# Patient Record
Sex: FEMALE | Race: WHITE | Employment: OTHER | URBAN - METROPOLITAN AREA
[De-identification: names, ages, dates, MRNs, and addresses within clinical notes are randomized per-mention and may not be internally consistent; named-entity substitution may affect disease eponyms.]

---

## 2018-01-30 ENCOUNTER — APPOINTMENT (OUTPATIENT)
Dept: CT IMAGING | Age: 68
DRG: 069 | End: 2018-01-30
Payer: COMMERCIAL

## 2018-01-30 ENCOUNTER — HOSPITAL ENCOUNTER (INPATIENT)
Age: 68
LOS: 2 days | Discharge: HOME | DRG: 069 | End: 2018-02-01
Attending: EMERGENCY MEDICINE | Admitting: NEUROLOGICAL SURGERY
Payer: COMMERCIAL

## 2018-01-30 ENCOUNTER — APPOINTMENT (OUTPATIENT)
Dept: MRI IMAGING | Age: 68
DRG: 069 | End: 2018-01-30
Payer: COMMERCIAL

## 2018-01-30 DIAGNOSIS — D32.9 MENINGIOMA (HCC): ICD-10-CM

## 2018-01-30 PROBLEM — G93.89 BRAIN MASS: Status: ACTIVE | Noted: 2018-01-30

## 2018-01-30 PROBLEM — R29.898 RIGHT ARM WEAKNESS: Status: ACTIVE | Noted: 2018-01-30

## 2018-01-30 PROBLEM — Z92.82 RECEIVED INTRAVENOUS TISSUE PLASMINOGEN ACTIVATOR (TPA) IN EMERGENCY DEPARTMENT: Status: ACTIVE | Noted: 2018-01-30

## 2018-01-30 PROBLEM — K14.8 TONGUE MASS: Status: ACTIVE | Noted: 2018-01-30

## 2018-01-30 LAB
ABSOLUTE EOS #: <0.03 K/UL (ref 0–0.44)
ABSOLUTE IMMATURE GRANULOCYTE: 0.03 K/UL (ref 0–0.3)
ABSOLUTE LYMPH #: 1.42 K/UL (ref 1.1–3.7)
ABSOLUTE MONO #: 0.56 K/UL (ref 0.1–1.2)
ANION GAP SERPL CALCULATED.3IONS-SCNC: 14 MMOL/L (ref 9–17)
BASOPHILS # BLD: 0 % (ref 0–2)
BASOPHILS ABSOLUTE: 0.04 K/UL (ref 0–0.2)
BUN BLDV-MCNC: 17 MG/DL (ref 8–23)
BUN/CREAT BLD: ABNORMAL (ref 9–20)
CALCIUM IONIZED: 1.27 MMOL/L (ref 1.13–1.33)
CALCIUM SERPL-MCNC: 9 MG/DL (ref 8.6–10.4)
CHLORIDE BLD-SCNC: 101 MMOL/L (ref 98–107)
CO2: 23 MMOL/L (ref 20–31)
CREAT SERPL-MCNC: 0.88 MG/DL (ref 0.5–0.9)
DIFFERENTIAL TYPE: ABNORMAL
EOSINOPHILS RELATIVE PERCENT: 0 % (ref 1–4)
ESTIMATED AVERAGE GLUCOSE: 131 MG/DL
GFR AFRICAN AMERICAN: >60 ML/MIN
GFR NON-AFRICAN AMERICAN: >60 ML/MIN
GFR SERPL CREATININE-BSD FRML MDRD: ABNORMAL ML/MIN/{1.73_M2}
GFR SERPL CREATININE-BSD FRML MDRD: ABNORMAL ML/MIN/{1.73_M2}
GLUCOSE BLD-MCNC: 116 MG/DL (ref 70–99)
HBA1C MFR BLD: 6.2 % (ref 4–6)
HCT VFR BLD CALC: 38.2 % (ref 36.3–47.1)
HEMOGLOBIN: 12.1 G/DL (ref 11.9–15.1)
IMMATURE GRANULOCYTES: 0 %
LYMPHOCYTES # BLD: 13 % (ref 24–43)
MAGNESIUM: 2.1 MG/DL (ref 1.6–2.6)
MCH RBC QN AUTO: 29.4 PG (ref 25.2–33.5)
MCHC RBC AUTO-ENTMCNC: 31.7 G/DL (ref 28.4–34.8)
MCV RBC AUTO: 92.9 FL (ref 82.6–102.9)
MONOCYTES # BLD: 5 % (ref 3–12)
NRBC AUTOMATED: 0 PER 100 WBC
PDW BLD-RTO: 13.2 % (ref 11.8–14.4)
PHOSPHORUS: 3.6 MG/DL (ref 2.6–4.5)
PLATELET # BLD: 263 K/UL (ref 138–453)
PLATELET ESTIMATE: ABNORMAL
PMV BLD AUTO: 10.9 FL (ref 8.1–13.5)
POTASSIUM SERPL-SCNC: 4.7 MMOL/L (ref 3.7–5.3)
RBC # BLD: 4.11 M/UL (ref 3.95–5.11)
RBC # BLD: ABNORMAL 10*6/UL
SEG NEUTROPHILS: 82 % (ref 36–65)
SEGMENTED NEUTROPHILS ABSOLUTE COUNT: 9.14 K/UL (ref 1.5–8.1)
SODIUM BLD-SCNC: 138 MMOL/L (ref 135–144)
WBC # BLD: 11.2 K/UL (ref 3.5–11.3)
WBC # BLD: ABNORMAL 10*3/UL

## 2018-01-30 PROCEDURE — 93005 ELECTROCARDIOGRAM TRACING: CPT

## 2018-01-30 PROCEDURE — 82330 ASSAY OF CALCIUM: CPT

## 2018-01-30 PROCEDURE — 99285 EMERGENCY DEPT VISIT HI MDM: CPT

## 2018-01-30 PROCEDURE — 70496 CT ANGIOGRAPHY HEAD: CPT

## 2018-01-30 PROCEDURE — 85025 COMPLETE CBC W/AUTO DIFF WBC: CPT

## 2018-01-30 PROCEDURE — 2000000003 HC NEURO ICU R&B

## 2018-01-30 PROCEDURE — 87641 MR-STAPH DNA AMP PROBE: CPT

## 2018-01-30 PROCEDURE — 6360000004 HC RX CONTRAST MEDICATION: Performed by: EMERGENCY MEDICINE

## 2018-01-30 PROCEDURE — 84100 ASSAY OF PHOSPHORUS: CPT

## 2018-01-30 PROCEDURE — 83036 HEMOGLOBIN GLYCOSYLATED A1C: CPT

## 2018-01-30 PROCEDURE — 70498 CT ANGIOGRAPHY NECK: CPT

## 2018-01-30 PROCEDURE — 6370000000 HC RX 637 (ALT 250 FOR IP): Performed by: NEUROLOGICAL SURGERY

## 2018-01-30 PROCEDURE — 83735 ASSAY OF MAGNESIUM: CPT

## 2018-01-30 PROCEDURE — A9579 GAD-BASE MR CONTRAST NOS,1ML: HCPCS | Performed by: EMERGENCY MEDICINE

## 2018-01-30 PROCEDURE — 6360000002 HC RX W HCPCS: Performed by: EMERGENCY MEDICINE

## 2018-01-30 PROCEDURE — 94762 N-INVAS EAR/PLS OXIMTRY CONT: CPT

## 2018-01-30 PROCEDURE — 36415 COLL VENOUS BLD VENIPUNCTURE: CPT

## 2018-01-30 PROCEDURE — 70450 CT HEAD/BRAIN W/O DYE: CPT

## 2018-01-30 PROCEDURE — 6370000000 HC RX 637 (ALT 250 FOR IP): Performed by: EMERGENCY MEDICINE

## 2018-01-30 PROCEDURE — 2580000003 HC RX 258: Performed by: NEUROLOGICAL SURGERY

## 2018-01-30 PROCEDURE — 99291 CRITICAL CARE FIRST HOUR: CPT | Performed by: NEUROLOGICAL SURGERY

## 2018-01-30 PROCEDURE — 80048 BASIC METABOLIC PNL TOTAL CA: CPT

## 2018-01-30 PROCEDURE — 70553 MRI BRAIN STEM W/O & W/DYE: CPT

## 2018-01-30 RX ORDER — HYDRALAZINE HYDROCHLORIDE 20 MG/ML
5 INJECTION INTRAMUSCULAR; INTRAVENOUS EVERY 6 HOURS PRN
Status: DISCONTINUED | OUTPATIENT
Start: 2018-01-30 | End: 2018-02-01 | Stop reason: HOSPADM

## 2018-01-30 RX ORDER — SODIUM CHLORIDE 0.9 % (FLUSH) 0.9 %
10 SYRINGE (ML) INJECTION EVERY 12 HOURS SCHEDULED
Status: DISCONTINUED | OUTPATIENT
Start: 2018-01-30 | End: 2018-02-01 | Stop reason: HOSPADM

## 2018-01-30 RX ORDER — SIMVASTATIN 40 MG
40 TABLET ORAL NIGHTLY
Status: ON HOLD | COMMUNITY
End: 2018-02-01 | Stop reason: HOSPADM

## 2018-01-30 RX ORDER — ATORVASTATIN CALCIUM 80 MG/1
80 TABLET, FILM COATED ORAL NIGHTLY
Status: DISCONTINUED | OUTPATIENT
Start: 2018-01-30 | End: 2018-01-31

## 2018-01-30 RX ORDER — FAMOTIDINE 20 MG/1
20 TABLET, FILM COATED ORAL 2 TIMES DAILY
Status: DISCONTINUED | OUTPATIENT
Start: 2018-01-30 | End: 2018-01-31

## 2018-01-30 RX ORDER — ACETAMINOPHEN 325 MG/1
650 TABLET ORAL EVERY 4 HOURS PRN
Status: DISCONTINUED | OUTPATIENT
Start: 2018-01-30 | End: 2018-02-01 | Stop reason: HOSPADM

## 2018-01-30 RX ORDER — SERTRALINE HYDROCHLORIDE 20 MG/ML
150 SOLUTION ORAL DAILY
Status: DISCONTINUED | OUTPATIENT
Start: 2018-01-30 | End: 2018-01-30

## 2018-01-30 RX ORDER — ONDANSETRON 2 MG/ML
4 INJECTION INTRAMUSCULAR; INTRAVENOUS EVERY 6 HOURS PRN
Status: DISCONTINUED | OUTPATIENT
Start: 2018-01-30 | End: 2018-02-01 | Stop reason: HOSPADM

## 2018-01-30 RX ORDER — CLONAZEPAM 0.5 MG/1
0.5 TABLET ORAL 2 TIMES DAILY
COMMUNITY

## 2018-01-30 RX ORDER — SODIUM CHLORIDE 0.9 % (FLUSH) 0.9 %
10 SYRINGE (ML) INJECTION PRN
Status: DISCONTINUED | OUTPATIENT
Start: 2018-01-30 | End: 2018-02-01 | Stop reason: HOSPADM

## 2018-01-30 RX ORDER — METFORMIN HYDROCHLORIDE 500 MG/1
500 TABLET, EXTENDED RELEASE ORAL 2 TIMES DAILY
Status: DISCONTINUED | OUTPATIENT
Start: 2018-01-30 | End: 2018-01-30

## 2018-01-30 RX ORDER — LISINOPRIL 2.5 MG/1
2.5 TABLET ORAL DAILY
Status: DISCONTINUED | OUTPATIENT
Start: 2018-01-30 | End: 2018-02-01 | Stop reason: HOSPADM

## 2018-01-30 RX ORDER — SODIUM CHLORIDE 0.9 % (FLUSH) 0.9 %
10 SYRINGE (ML) INJECTION 2 TIMES DAILY
Status: DISCONTINUED | OUTPATIENT
Start: 2018-01-30 | End: 2018-02-01 | Stop reason: HOSPADM

## 2018-01-30 RX ORDER — METFORMIN HYDROCHLORIDE 500 MG/1
500 TABLET, EXTENDED RELEASE ORAL 2 TIMES DAILY
COMMUNITY

## 2018-01-30 RX ORDER — CLONAZEPAM 0.25 MG/1
0.25 TABLET, ORALLY DISINTEGRATING ORAL 2 TIMES DAILY
Status: DISCONTINUED | OUTPATIENT
Start: 2018-01-30 | End: 2018-02-01 | Stop reason: HOSPADM

## 2018-01-30 RX ADMIN — Medication 10 ML: at 20:38

## 2018-01-30 RX ADMIN — IOPAMIDOL 90 ML: 755 INJECTION, SOLUTION INTRAVENOUS at 13:53

## 2018-01-30 RX ADMIN — ACETAMINOPHEN 650 MG: 325 TABLET ORAL at 20:37

## 2018-01-30 RX ADMIN — FAMOTIDINE 20 MG: 20 TABLET, FILM COATED ORAL at 20:37

## 2018-01-30 RX ADMIN — GADOPENTETATE DIMEGLUMINE 16 ML: 469.01 INJECTION INTRAVENOUS at 18:27

## 2018-01-30 RX ADMIN — HYDRALAZINE HYDROCHLORIDE 5 MG: 20 INJECTION INTRAMUSCULAR; INTRAVENOUS at 17:42

## 2018-01-30 RX ADMIN — CLONAZEPAM 0.25 MG: 0.25 TABLET, ORALLY DISINTEGRATING ORAL at 22:22

## 2018-01-30 RX ADMIN — ATORVASTATIN CALCIUM 80 MG: 80 TABLET, FILM COATED ORAL at 22:22

## 2018-01-30 ASSESSMENT — ENCOUNTER SYMPTOMS
RHINORRHEA: 0
ABDOMINAL PAIN: 0
SHORTNESS OF BREATH: 0

## 2018-01-30 ASSESSMENT — PAIN SCALES - GENERAL
PAINLEVEL_OUTOF10: 0
PAINLEVEL_OUTOF10: 3

## 2018-01-30 ASSESSMENT — PAIN DESCRIPTION - ORIENTATION: ORIENTATION: LOWER

## 2018-01-30 ASSESSMENT — PAIN DESCRIPTION - PAIN TYPE: TYPE: ACUTE PAIN

## 2018-01-30 ASSESSMENT — PAIN DESCRIPTION - DESCRIPTORS: DESCRIPTORS: ACHING;DISCOMFORT

## 2018-01-30 ASSESSMENT — PAIN DESCRIPTION - LOCATION: LOCATION: BACK

## 2018-01-30 NOTE — H&P
[address]    Neurocritical Care Progress Note  Neuro ICU History & Physical      History Obtained From:  Pt, EMR, ED staff, EMS     CHIEF COMPLAINT: Right arm weakness, right facial droop, slurred speech     HISTORY OF PRESENT ILLNESS:    The patient is a 78 y.o. female w/ pmhx DM on metformin and HTN who is transferred from outlying facility after receiving tPA for presumed CVA. Pt had right arm weakness and right sided facial droop w/ slurred speech early this morning but symptoms resolved. Pt then went to her doctor's to have breast ultrasound and symptoms returned just after 8 am this morning. She then went to the ED and had a negative CT head wo contrast. tPA was started at 12:02. Now, pt states slurred speech has improved , still having right arm weakness. Denies numbness, vision changes, HA or neck pain or dizziness. Past Medical History:        Diagnosis Date    Depression     Diabetes mellitus ([facility] [address]     Hyperlipidemia     Hypertension        Past Surgical History:        Procedure Laterality Date    HYSTERECTOMY         Medications Prior to Admission:    Not in a hospital admission. Allergies:    Review of patient's allergies indicates no known allergies. Social History:   TOBACCO:    ETOH:   DRUGS:     Family History:   No family history on file.     REVIEW OF SYSTEMS:  CONSTITUTIONAL:  negative for  fevers, chills, sweats and weight loss  HEENT:  negative for  nasal congestion, sore throat, hoarseness and voice change  RESPIRATORY:  negative for  cough with sputum, dyspnea, wheezing, hemoptysis and pleuritic pain  CARDIOVASCULAR:  negative for  chest pain, dyspnea, palpitations, orthopnea, PND, exertional chest pressure/discomfort  GASTROINTESTINAL:  negative for nausea, vomiting, diarrhea, constipation and abdominal pain  GENITOURINARY:  negative for frequency, dysuria and nocturia  MUSCULOSKELETAL:  negative for  myalgias, arthralgias and joint swelling  NEUROLOGICAL:  negative for memory problems, + for speech problems, neg for visual disturbance, + for weakness, neg for numbness and tingling    Vitals:  BP (!) 150/88   Pulse 98   Temp 99.5 °F (37.5 °C) (Oral)   Resp 24   SpO2 96%     PHYSICAL EXAM:  CONSTITUTIONAL:  awake, alert, cooperative, no apparent distress, and appears stated age  EYES:  Lids and lashes normal, pupils equal, round and reactive to light, extra ocular muscles intact, sclera clear, conjunctiva normal  NECK:  Supple, symmetrical, trachea midline, no adenopathy, thyroid symmetric, not enlarged and no tenderness, skin normal  HEMATOLOGIC/LYMPHATICS:  no cervical lymphadenopathy  LUNGS:  No increased work of breathing, good air exchange, clear to auscultation bilaterally, no crackles or wheezing  CARDIOVASCULAR:  Normal apical impulse, regular rate and rhythm, normal S1 and S2, no S3 or S4, and no murmur noted  ABDOMEN:  No scars, normal bowel sounds, soft, non-distended, non-tender, no masses palpated, no hepatosplenomegally  MUSCULOSKELETAL:  There is no redness, warmth, or swelling of the joints. Full range of motion noted. Motor strength is 5 out of 5 all extremities bilaterally. Tone is normal.  NEUROLOGIC:     CONSTITUTIONAL:  Well developed, well nourished, alert and oriented x 3, in no acute distress. GCS 15. Nontoxic. + dysarthria. No aphasia.    HEAD:  normocephalic, atraumatic    EYES:  PERRLA, EOMI.   ENT:  moist mucous membranes   NECK:  supple, symmetric, no midline tenderness to palpation    BACK:  without midline tenderness, step-offs or deformities    LUNGS:  Equal air entry bilaterally   CARDIOVASCULAR:  normal s1 / s2   ABDOMEN:  Soft, no rigidity   NEUROLOGIC:  Mental Status:  A & O x3,awake             Cranial Nerves:    cranial nerves II-XII are grossly intact other than right sided facial droop    Motor Exam:    Drift:  absent  Tone:  abnormal - RUE weakness    Motor exam is symmetrical 5 out of 5 all extremities bilaterally except for RUE 4/5    Sensory:    Touch:    Right Upper Extremity:  normal  Left Upper Extremity:  normal  Right Lower Extremity:  normal  Left Lower Extremity:  normal    Deep Tendon Reflexes:    Right Bicep:  2+  Left Bicep:  2+  Right Knee:  2+  Left Knee:  2+    Plantar Response:  Right:  downgoing  Left:  downgoing    Clonus:  absent  Alberto's:  absent    Coordination/Dysmetria:  Heel to Shin:  Right:  normal  Left:  normal  Finger to Nose:   Right:  abnormal -   Left:  normal   Dysdiadochokinesia:  absent    Gait: Deferred    SKIN:  no rash            DATA:  Pending      ASSESSMENT/PLAN:  78 y.o. female who presents s/p tPA at 12:02 after presenting to [facility] ED with right arm weakness, right facial droop and slurred speech. Pt notes mild improvement of slurred speech, NIH 4 currently.       Patient care will be discussed with attending, will reevaluated patient along with attending.      - CTA H/N              - MRI Brain WO              - ECHO, Carotid US B/L              - Hold ASA and plavix as pt is post tPA              - Zocor 80mg nightly               - Fasting Lipid panel              - PT, OT, Speech eval               - Hydrate with 500cc bolus then IVF NS @ 75cc/hr               - Telemetry               - Neuro checks per protocol  - We recommend SBP <185  - 24-hr post tPA CT head  - Cardio: Stable, RRR, f/u EKG, TTE, restart home meds (lisinopril)  - Pulm: Stable, spo2 96% on RA, no underlying pulm disease  - GI: Stable, NPO for now, will restart carb control diet, pepcid 20 mg bid  - Renal: F/u electrolytes, replace as needed, NS 75 cc/hr     Blood glucose goal less than 180  Please avoid dextrose containing solutions        Additional recommendations may follow     Please contact [facility] NS[facility] with any changes in patients neurologic status.      Thank you for your consult.            Tiera Porter,   Emergency Medicine Resident  Neuro Critical Care  Neurosurgical and Neuro

## 2018-01-30 NOTE — ED NOTES
Bed: 14  Expected date:   Expected time:   Means of arrival:   Comments:     Pietro Garcia, MAXX  01/30/18 6218

## 2018-01-30 NOTE — ED PROVIDER NOTES
[facility]  Emergency Department Encounter  Emergency Medicine Resident     Pt Name: Dorina Beaver  MRN: [id number]  [place] 1950  Date of evaluation: 1/30/18  PCP:  Deny Oleary MD    CHIEF COMPLAINT       Chief Complaint   Patient presents with    Facial Droop       HISTORY OF PRESENT ILLNESS  (Location/Symptom, Timing/Onset, Context/Setting, Quality, Duration, Modifying Factors, Severity, Associated signs/symptoms)     Dorina Beaver is a 78 y.o. female who presents As a transfer from [facility] after having right-sided weakness that started around 8:15 in the morning today. Patient had consultation done by neuro endovascular team while at [facility] and decision was made to start TPA. Patient's deficits include right arm weakness, right-sided facial droop with slurred speech. States that she did have some resolution in the morning and then subsequently had recurrence of her symptoms. Per Dr. Daren Gutiérrez, decision was made to give TPA. On arrival patient states that some of her symptoms have improved. PAST MEDICAL / SURGICAL / SOCIAL / FAMILY HISTORY      has a past medical history of Depression; Diabetes mellitus ([address] Hyperlipidemia; and Hypertension. has a past surgical history that includes Hysterectomy. Social History     Social History    Marital status:      Spouse name: N/A    Number of children: N/A    Years of education: N/A     Occupational History    Not on file. Social History Main Topics    Smoking status: Never Smoker    Smokeless tobacco: Never Used    Alcohol use 0.6 oz/week     1 Glasses of wine per week    Drug use: No    Sexual activity: No     Other Topics Concern    Not on file     Social History Narrative    No narrative on file       No family history on file. Allergies:  Review of patient's allergies indicates no known allergies.     Home Medications:  Prior to Admission medications    Medication Sig Start Date End Date tenderness. There is no guarding. Neurological: She is alert and oriented to person, place, and time. No sensory deficit. GCS eye subscore is 4. GCS verbal subscore is 5. GCS motor subscore is 6. Right-sided facial droop, right arm weakness compared to the left upper extremity weakness; positive pronator drift of the right upper extremity maintaining to gravity; no lower extremity weakness   Skin: Skin is warm and dry. She is not diaphoretic. Nursing note and vitals reviewed. DIFFERENTIAL  DIAGNOSIS     PLAN (LABS / IMAGING / EKG):  Orders Placed This Encounter   Procedures    MRSA DNA Probe, Nasal    CTA HEAD W CONTRAST    CTA NECK W CONTRAST    CT head without contrast    MRI brain without contrast    CT HEAD WO CONTRAST    Lipid panel - fasting    CBC WITH AUTO DIFFERENTIAL    BASIC METABOLIC PANEL    MAGNESIUM    Phosphorus    Calcium, Ionized    Diet NPO Effective Now    Vital signs during and post alteplase (tPA)    Telemetry monitoring    Notify physician during and post alteplase (tPA)    Notify physician during and post alteplase (tPA)    Bedrest during and post alteplase (tPA)    Elevate HOB during and post alteplase (tPA)    Adv Diet as Josephine (nurse communication)    Neuro checks during and post alteplase (tPA)    NIH Stroke Scale assessment by nurse    Implement alteplase (tPA) hemorrhage/bleeding precautions    Avoid antiplatelet and antithrombotic medications for 24 hours post administration of alteplase (tPA)    No Anticoagulants for 24 hours after alteplase (tPA)    Implement suspected intracerebral hemorrhage (ICH) guidelines    Assess for aphasia/dysphagia/severe dysarthria    Swallow screen by nursing before diet and oral medications started.     Stroke education    Encourage deep breathing and coughing every two hours while awake    Place intermittent pneumatic compression device    Full Code    OT eval and treat    PT evaluation and treat    Initiate Oxygen Therapy Protocol    Incentive spirometry    Pulse oximetry, continuous    Speech Language Pathology (SLP) eval and treat    EKG 12 Lead    ECHO Complete 2D W Doppler W Color    Initiate minimum 2 IV lines for alteplase (tPA) infusion    PATIENT STATUS (FROM ED OR OR/PROCEDURAL) Inpatient    Fall precautions       MEDICATIONS ORDERED:  Orders Placed This Encounter   Medications    iopamidol (ISOVUE-370) 76 % injection 90 mL    clonazePAM (KLONOPIN) disintegrating tablet 0.25 mg    lisinopril (PRINIVIL;ZESTRIL) tablet 2.5 mg    DISCONTD: metFORMIN (GLUCOPHAGE-XR) extended release tablet 500 mg    sertraline (ZOLOFT) 20 MG/ML concentrated solution 150 mg    sodium chloride flush 0.9 % injection 10 mL    sodium chloride flush 0.9 % injection 10 mL    acetaminophen (TYLENOL) tablet 650 mg    magnesium hydroxide (MILK OF MAGNESIA) 400 MG/5ML suspension 30 mL    ondansetron (ZOFRAN) injection 4 mg    atorvastatin (LIPITOR) tablet 80 mg    famotidine (PEPCID) tablet 20 mg     DIAGNOSTIC RESULTS / EMERGENCY DEPARTMENT COURSE / MDM     LABS:  No results found for this visit on 01/30/18. RADIOLOGY:  Ct Head Wo Contrast    Addendum Date: 1/30/2018    ADDENDUM: The extra-axial mass described in #3 of the impression is along the LEFT inferior frontal lobe as described in the findings section. Result Date: 1/30/2018  EXAMINATION: CT OF THE HEAD WITHOUT CONTRAST  1/30/2018 1:42 pm TECHNIQUE: CT of the head was performed without the administration of intravenous contrast. Dose modulation, iterative reconstruction, and/or weight based adjustment of the mA/kV was utilized to reduce the radiation dose to as low as reasonably achievable. COMPARISON: 01/30/2018 at 10:27 a.m. HISTORY: Right arm weakness with slurred speech. Initial evaluation. FINDINGS: BRAIN/VENTRICLES: There is no evidence of an acute intracranial hemorrhage.  There appears to be an extra-axial hyperdense mass along the inferior left criteria. BONES: No acute osseous abnormality identified. CTA HEAD: ANTERIOR CIRCULATION: There is non flow limiting atherosclerosis involving the internal carotid arteries bilaterally. No focal stenosis seen of the anterior cerebral or middle cerebral arteries. POSTERIOR CIRCULATION: The posterior cerebral arteries demonstrate no focal stenosis. The vertebral and basilar arteries appear unremarkable. ANEURYSM: No intracranial aneurysm is seen. BRAIN: Enhancing extra-axial masses seen along is the inferior left frontal convexity. There is mass effect on the inferior left frontal lobe. 1. No focal stenosis is seen of the cervical carotid or vertebral arteries. 2. No focal stenosis of the [place]. 3. Incidentally noted is an enhancing lobulated mass within the right tongue base. Consider correlation with direct visualization. Findings were discussed with Dr. Pallavi Mcneil on 1/30/2018 at 2:21 pm.      EMERGENCY DEPARTMENT COURSE:    MDM: Patient was immediately evaluated by neuro endovascular team as well as neuro ICU arrival.  Continues to have right-sided facial droop as well as right upper and 70 weakness. She is alert and oriented this time protecting her own airway. Finished her TPA in route to the emergency department here. She will be admitted to the neuro ICU for further management. TPA post protocol. PROCEDURES:  None    CONSULTS:  None    FINAL IMPRESSION      1. Facial droop due to stroke          DISPOSITION / PLAN     DISPOSITION Admitted 01/30/2018 01:32:58 PM      PATIENT REFERRED TO:  No follow-up provider specified.     DISCHARGE MEDICATIONS:  Current Discharge Medication List          Carol Amin MD  Emergency Medicine Resident, PGY-2  [facility]    (Please note that portions of this note were completed with a voice recognition program.  Efforts were made to edit the dictations but occasionally words are mis-transcribed.)       Carol Amin

## 2018-01-30 NOTE — ED NOTES
Report called to [facility], Questions Answered.       Zach Zafar, RN  01/30/18 7390

## 2018-01-30 NOTE — ED NOTES
Slurred speech, right facial droop. Right facial droop. No headache, no visual changes. 7.65 mg bolus of TPA at 1202  68.8 mg drip of TPA at 1204  TPA completed at 1306 for a total of 76.5 mg   NS to flush line for 20 mL.       Eleanor Morejon RN  01/30/18 7589

## 2018-01-30 NOTE — ED NOTES
Patient arrived to ED via [facility], transfer from [facility]. Pt was getting ready at [place] [address] today drying her hair, pt has right sided weakness, some of the weakness decreased and patient had to go to [facility] for breast ultrasound. Pt was at hospital and at [facility] when she began having right sided weakness, right facial droop, right hand weakness, and slurred speech. TPA was administered PTA. On arrival pt denies any visual changes. States she has some left shoulder pain, does not want medication, states she believes it's from how she has been sitting. Pt states she has some right arm weakness but patient is able to move all extremities. Pt has equal, strong hand grasp. Pt has right sided facial droop, right tongue deviation, and slurred speech. Pt is aox4, answering questions appropriately.  Pt son Mari Phillips is en route to hospital.      Constanza Yip RN  01/30/18 6538

## 2018-01-30 NOTE — ED PROVIDER NOTES
[facility]     Emergency Department     Faculty Note/ Attestation      Pt Name: Reynaldo Paulino                                       MRN: [id number]  [place] 1950  Date of evaluation: 1/30/2018    Patients PCP:    No primary care provider on file. Attestation  I performed a history and physical examination of the patient and discussed management with the resident. I reviewed the residents note and agree with the documented findings and plan of care. Any areas of disagreement are noted on the chart. I was personally present for the key portions of any procedures. I have documented in the chart those procedures where I was not present during the key portions. I have reviewed the emergency nurses triage note. I agree with the chief complaint, past medical history, past surgical history, allergies, medications, social and family history as documented unless otherwise noted below. For Physician Assistant/ Nurse Practitioner cases/documentation I have personally evaluated this patient and have completed at least one if not all key elements of the E/M (history, physical exam, and MDM). Additional findings are as noted. Initial Screens:             Vitals: There were no vitals filed for this visit. CHIEF COMPLAINT       Chief Complaint   Patient presents with    Facial Droop             DIAGNOSTIC RESULTS             RADIOLOGY:   No orders to display         LABS:  Labs Reviewed - No data to display      EMERGENCY DEPARTMENT COURSE:     -------------------------   ,  ,  ,        Comments            [place] MD, F.A.C.E.P.   Attending Emergency Physician         Milka Fields MD  01/30/18 9394

## 2018-01-30 NOTE — FLOWSHEET NOTE
01/30/18 1427   Encounter Summary   Services provided to: Patient   Continue Visiting (1/30/18)   Complexity of Encounter Moderate   Length of Encounter 30 minutes   Spiritual Assessment Completed Yes   Routine   Type Initial       SPIRITUAL CARE DEPARTMENT - [facility]     Emergency/Trauma Note    PATIENT NAME: Nata Bernal    Shift date: 1/30/18  Shift day: Tuesday   Shift # 1    Room # 6479/6828-52   Name: Nata Bernal            Age: 78 y.o. Gender: female          Sikh: [address] [place] [address] [employer] of [employer]: [employer], [address] [place] [address]  Trauma/Incident type: Stroke Alert  Admit Date & Time: 1/30/2018  1:09 PM    PATIENT/EVENT DESCRIPTION:  Nata Bernal is a 78 y.o. female who arrived via ground ambulance as transfer from [facility]. Per report the patient arrived at [facility] with stroke symptoms including facial droop, slurred speech and right sided weakness. She was given tpa at [place] and then brought to [facility]. V's. Since arrival at [facility]. V's her symptoms have mostly resolved. Pt to be admitted to 0516/0516-01. SPIRITUAL ASSESSMENT/INTERVENTION:  I spoke with the patient who was awake and alert and talking clearly. She admitted to being a bit anxious because of the excitement of her sudden illness and stops in 2 different hospitals and now feeling so far from home. Her family was yet to arrive at [facility]. V's at the time of this writing. Patient indicates that she occasionally attends [employer] in [address] [place] [address], which she refers to as a eNovance. She was receptive to my visit and and accepted my offer to pray for her. PATIENT BELONGINGS:  I did not handle the patient's belongings. REGISTRATION STAFF NOTIFIED? Yes    WHAT IS YOUR SPIRITUAL CARE PLAN FOR THIS PATIENT?:  Chaplains are available for further spiritual and emotional support as needed.     Electronically signed by Chaplain Braden, on 1/30/2018 at 2:28 PM.

## 2018-01-31 ENCOUNTER — APPOINTMENT (OUTPATIENT)
Dept: CT IMAGING | Age: 68
DRG: 069 | End: 2018-01-31
Payer: COMMERCIAL

## 2018-01-31 PROBLEM — D32.9 MENINGIOMA (HCC): Status: ACTIVE | Noted: 2018-01-31

## 2018-01-31 PROBLEM — G45.1 HEMISPHERIC CAROTID ARTERY SYNDROME: Status: ACTIVE | Noted: 2018-01-31

## 2018-01-31 LAB
ABSOLUTE EOS #: 0.13 K/UL (ref 0–0.44)
ABSOLUTE IMMATURE GRANULOCYTE: 0.03 K/UL (ref 0–0.3)
ABSOLUTE LYMPH #: 2.51 K/UL (ref 1.1–3.7)
ABSOLUTE MONO #: 0.78 K/UL (ref 0.1–1.2)
ANION GAP SERPL CALCULATED.3IONS-SCNC: 11 MMOL/L (ref 9–17)
BASOPHILS # BLD: 1 % (ref 0–2)
BASOPHILS ABSOLUTE: 0.05 K/UL (ref 0–0.2)
BUN BLDV-MCNC: 15 MG/DL (ref 8–23)
BUN/CREAT BLD: ABNORMAL (ref 9–20)
CALCIUM IONIZED: 1.18 MMOL/L (ref 1.13–1.33)
CALCIUM SERPL-MCNC: 9.1 MG/DL (ref 8.6–10.4)
CHLORIDE BLD-SCNC: 101 MMOL/L (ref 98–107)
CHOLESTEROL/HDL RATIO: 1.8
CHOLESTEROL: 119 MG/DL
CO2: 25 MMOL/L (ref 20–31)
CREAT SERPL-MCNC: 1 MG/DL (ref 0.5–0.9)
DIFFERENTIAL TYPE: ABNORMAL
EKG ATRIAL RATE: 87 BPM
EKG P AXIS: 53 DEGREES
EKG P-R INTERVAL: 166 MS
EKG Q-T INTERVAL: 364 MS
EKG QRS DURATION: 64 MS
EKG QTC CALCULATION (BAZETT): 438 MS
EKG R AXIS: 14 DEGREES
EKG T AXIS: 50 DEGREES
EKG VENTRICULAR RATE: 87 BPM
EOSINOPHILS RELATIVE PERCENT: 2 % (ref 1–4)
GFR AFRICAN AMERICAN: >60 ML/MIN
GFR NON-AFRICAN AMERICAN: 55 ML/MIN
GFR SERPL CREATININE-BSD FRML MDRD: ABNORMAL ML/MIN/{1.73_M2}
GFR SERPL CREATININE-BSD FRML MDRD: ABNORMAL ML/MIN/{1.73_M2}
GLUCOSE BLD-MCNC: 109 MG/DL (ref 70–99)
HCT VFR BLD CALC: 35.9 % (ref 36.3–47.1)
HDLC SERPL-MCNC: 65 MG/DL
HEMOGLOBIN: 11.2 G/DL (ref 11.9–15.1)
IMMATURE GRANULOCYTES: 0 %
LDL CHOLESTEROL: 42 MG/DL (ref 0–130)
LV EF: 55 %
LVEF MODALITY: NORMAL
LYMPHOCYTES # BLD: 29 % (ref 24–43)
MAGNESIUM: 2.1 MG/DL (ref 1.6–2.6)
MCH RBC QN AUTO: 28.6 PG (ref 25.2–33.5)
MCHC RBC AUTO-ENTMCNC: 31.2 G/DL (ref 28.4–34.8)
MCV RBC AUTO: 91.6 FL (ref 82.6–102.9)
MONOCYTES # BLD: 9 % (ref 3–12)
MRSA, DNA, NASAL: NORMAL
NRBC AUTOMATED: 0 PER 100 WBC
PDW BLD-RTO: 13.6 % (ref 11.8–14.4)
PHOSPHORUS: 4 MG/DL (ref 2.6–4.5)
PLATELET # BLD: 236 K/UL (ref 138–453)
PLATELET ESTIMATE: ABNORMAL
PMV BLD AUTO: 10.8 FL (ref 8.1–13.5)
POTASSIUM SERPL-SCNC: 4.4 MMOL/L (ref 3.7–5.3)
RBC # BLD: 3.92 M/UL (ref 3.95–5.11)
RBC # BLD: ABNORMAL 10*6/UL
SEG NEUTROPHILS: 59 % (ref 36–65)
SEGMENTED NEUTROPHILS ABSOLUTE COUNT: 5.14 K/UL (ref 1.5–8.1)
SODIUM BLD-SCNC: 137 MMOL/L (ref 135–144)
SPECIMEN DESCRIPTION: NORMAL
TRIGL SERPL-MCNC: 61 MG/DL
VLDLC SERPL CALC-MCNC: NORMAL MG/DL (ref 1–30)
WBC # BLD: 8.6 K/UL (ref 3.5–11.3)
WBC # BLD: ABNORMAL 10*3/UL

## 2018-01-31 PROCEDURE — 83735 ASSAY OF MAGNESIUM: CPT

## 2018-01-31 PROCEDURE — G8988 SELF CARE GOAL STATUS: HCPCS

## 2018-01-31 PROCEDURE — 2580000003 HC RX 258: Performed by: FAMILY MEDICINE

## 2018-01-31 PROCEDURE — 36415 COLL VENOUS BLD VENIPUNCTURE: CPT

## 2018-01-31 PROCEDURE — G8989 SELF CARE D/C STATUS: HCPCS

## 2018-01-31 PROCEDURE — 80048 BASIC METABOLIC PNL TOTAL CA: CPT

## 2018-01-31 PROCEDURE — 97162 PT EVAL MOD COMPLEX 30 MIN: CPT

## 2018-01-31 PROCEDURE — G9175 SPEECH LANG GOAL STATUS: HCPCS

## 2018-01-31 PROCEDURE — 99223 1ST HOSP IP/OBS HIGH 75: CPT | Performed by: FAMILY MEDICINE

## 2018-01-31 PROCEDURE — 95951 PR EEG MONITORING/VIDEORECORD: CPT | Performed by: PSYCHIATRY & NEUROLOGY

## 2018-01-31 PROCEDURE — 6370000000 HC RX 637 (ALT 250 FOR IP): Performed by: NEUROLOGICAL SURGERY

## 2018-01-31 PROCEDURE — 85025 COMPLETE CBC W/AUTO DIFF WBC: CPT

## 2018-01-31 PROCEDURE — 95951 HC EEG MONITORING VIDEO RECORDING: CPT

## 2018-01-31 PROCEDURE — 84100 ASSAY OF PHOSPHORUS: CPT

## 2018-01-31 PROCEDURE — 94762 N-INVAS EAR/PLS OXIMTRY CONT: CPT

## 2018-01-31 PROCEDURE — 82330 ASSAY OF CALCIUM: CPT

## 2018-01-31 PROCEDURE — 70450 CT HEAD/BRAIN W/O DYE: CPT

## 2018-01-31 PROCEDURE — 6370000000 HC RX 637 (ALT 250 FOR IP): Performed by: EMERGENCY MEDICINE

## 2018-01-31 PROCEDURE — 92523 SPEECH SOUND LANG COMPREHEN: CPT

## 2018-01-31 PROCEDURE — G9174 SPEECH LANG CURRENT STATUS: HCPCS

## 2018-01-31 PROCEDURE — 97530 THERAPEUTIC ACTIVITIES: CPT

## 2018-01-31 PROCEDURE — 97165 OT EVAL LOW COMPLEX 30 MIN: CPT

## 2018-01-31 PROCEDURE — 97535 SELF CARE MNGMENT TRAINING: CPT

## 2018-01-31 PROCEDURE — G8978 MOBILITY CURRENT STATUS: HCPCS

## 2018-01-31 PROCEDURE — 99233 SBSQ HOSP IP/OBS HIGH 50: CPT | Performed by: NEUROLOGICAL SURGERY

## 2018-01-31 PROCEDURE — 2060000000 HC ICU INTERMEDIATE R&B

## 2018-01-31 PROCEDURE — 6370000000 HC RX 637 (ALT 250 FOR IP): Performed by: FAMILY MEDICINE

## 2018-01-31 PROCEDURE — 93306 TTE W/DOPPLER COMPLETE: CPT

## 2018-01-31 PROCEDURE — G8987 SELF CARE CURRENT STATUS: HCPCS

## 2018-01-31 PROCEDURE — G8979 MOBILITY GOAL STATUS: HCPCS

## 2018-01-31 PROCEDURE — 6360000002 HC RX W HCPCS: Performed by: PSYCHIATRY & NEUROLOGY

## 2018-01-31 PROCEDURE — 80061 LIPID PANEL: CPT

## 2018-01-31 RX ORDER — SODIUM CHLORIDE 9 MG/ML
INJECTION, SOLUTION INTRAVENOUS CONTINUOUS
Status: ACTIVE | OUTPATIENT
Start: 2018-01-31 | End: 2018-02-01

## 2018-01-31 RX ORDER — SENNA PLUS 8.6 MG/1
1 TABLET ORAL NIGHTLY
Status: DISCONTINUED | OUTPATIENT
Start: 2018-01-31 | End: 2018-02-01 | Stop reason: HOSPADM

## 2018-01-31 RX ORDER — DEXTROSE MONOHYDRATE 25 G/50ML
12.5 INJECTION, SOLUTION INTRAVENOUS PRN
Status: DISCONTINUED | OUTPATIENT
Start: 2018-01-31 | End: 2018-02-01 | Stop reason: HOSPADM

## 2018-01-31 RX ORDER — ASPIRIN 81 MG/1
81 TABLET, CHEWABLE ORAL DAILY
Status: DISCONTINUED | OUTPATIENT
Start: 2018-01-31 | End: 2018-02-01 | Stop reason: HOSPADM

## 2018-01-31 RX ORDER — LEVETIRACETAM 10 MG/ML
1000 INJECTION INTRAVASCULAR ONCE
Status: COMPLETED | OUTPATIENT
Start: 2018-01-31 | End: 2018-01-31

## 2018-01-31 RX ORDER — LEVETIRACETAM 5 MG/ML
500 INJECTION INTRAVASCULAR EVERY 12 HOURS
Status: DISCONTINUED | OUTPATIENT
Start: 2018-02-01 | End: 2018-02-01 | Stop reason: HOSPADM

## 2018-01-31 RX ORDER — NICOTINE POLACRILEX 4 MG
15 LOZENGE BUCCAL PRN
Status: DISCONTINUED | OUTPATIENT
Start: 2018-01-31 | End: 2018-02-01 | Stop reason: HOSPADM

## 2018-01-31 RX ORDER — LOSARTAN POTASSIUM 100 MG/1
100 TABLET ORAL DAILY
Status: ON HOLD | COMMUNITY
End: 2018-02-01 | Stop reason: HOSPADM

## 2018-01-31 RX ORDER — ATORVASTATIN CALCIUM 40 MG/1
40 TABLET, FILM COATED ORAL NIGHTLY
Status: DISCONTINUED | OUTPATIENT
Start: 2018-01-31 | End: 2018-02-01 | Stop reason: HOSPADM

## 2018-01-31 RX ORDER — DEXTROSE MONOHYDRATE 50 MG/ML
100 INJECTION, SOLUTION INTRAVENOUS PRN
Status: DISCONTINUED | OUTPATIENT
Start: 2018-01-31 | End: 2018-02-01 | Stop reason: HOSPADM

## 2018-01-31 RX ADMIN — FAMOTIDINE 20 MG: 20 TABLET, FILM COATED ORAL at 09:07

## 2018-01-31 RX ADMIN — SERTRALINE 150 MG: 50 TABLET, FILM COATED ORAL at 09:06

## 2018-01-31 RX ADMIN — DESMOPRESSIN ACETATE 40 MG: 0.2 TABLET ORAL at 21:11

## 2018-01-31 RX ADMIN — LISINOPRIL 2.5 MG: 2.5 TABLET ORAL at 09:07

## 2018-01-31 RX ADMIN — ASPIRIN 81 MG: 81 TABLET, CHEWABLE ORAL at 21:11

## 2018-01-31 RX ADMIN — LEVETIRACETAM 1000 MG: 1000 INJECTION, SOLUTION INTRAVENOUS at 21:11

## 2018-01-31 RX ADMIN — CLONAZEPAM 0.25 MG: 0.25 TABLET, ORALLY DISINTEGRATING ORAL at 09:07

## 2018-01-31 RX ADMIN — CLONAZEPAM 0.25 MG: 0.25 TABLET, ORALLY DISINTEGRATING ORAL at 23:29

## 2018-01-31 RX ADMIN — SODIUM CHLORIDE: 9 INJECTION, SOLUTION INTRAVENOUS at 21:10

## 2018-01-31 ASSESSMENT — ENCOUNTER SYMPTOMS
CONSTIPATION: 0
ABDOMINAL PAIN: 0
NAUSEA: 0
BLOOD IN STOOL: 0
SINUS PRESSURE: 0
DIARRHEA: 0
VOMITING: 0
VOICE CHANGE: 0
COUGH: 0
SHORTNESS OF BREATH: 0
WHEEZING: 0
SORE THROAT: 0

## 2018-01-31 ASSESSMENT — PAIN DESCRIPTION - PAIN TYPE: TYPE: ACUTE PAIN

## 2018-01-31 ASSESSMENT — PAIN DESCRIPTION - ORIENTATION: ORIENTATION: ANTERIOR;POSTERIOR

## 2018-01-31 ASSESSMENT — PAIN DESCRIPTION - DESCRIPTORS: DESCRIPTORS: ACHING;HEADACHE

## 2018-01-31 ASSESSMENT — PAIN SCALES - GENERAL: PAINLEVEL_OUTOF10: 1

## 2018-01-31 ASSESSMENT — PAIN DESCRIPTION - FREQUENCY: FREQUENCY: CONTINUOUS

## 2018-01-31 ASSESSMENT — PAIN DESCRIPTION - LOCATION: LOCATION: HEAD

## 2018-01-31 NOTE — PROGRESS NOTES
Tolerance  Activity Tolerance: Patient Tolerated treatment well          Plan   Plan  Times per week: 5-6 times/week  Times per day: Daily  Current Treatment Recommendations: Strengthening, Balance Training, Functional Mobility Training, Transfer Training, Gait Training, Stair training, Endurance Training, Home Exercise Program  Safety Devices  Type of devices: All fall risk precautions in place, Call light within reach, Gait belt, Patient at risk for falls, Left in chair, Nurse notified  Restraints  Initially in place: No    G-Code  PT G-Codes  Functional Assessment Tool Used: [place]  Score: 24  Functional Limitation: Mobility: Walking and moving around  Mobility: Walking and Moving Around Current Status ():  At least 1 percent but less than 20 percent impaired, limited or restricted  Mobility: Walking and Moving Around Goal Status (): 0 percent impaired, limited or restricted             Goals  Short term goals  Time Frame for Short term goals: 12 visits  Short term goal 1: pt ambulate 500 ft on level surfaces independently  Short term goal 2: pt ascend/descend 3 stairs with 1 handrail independently  Short term goal 3: pt independent in functiontal transfers       Therapy Time   Individual Concurrent Group Co-treatment   Time In 0815         Time Out 0840         Minutes 25                 Nini Park, SPT  This patient was seen by and documented on by Boris Ferrera, Student Physical Therapist, under the supervision of Sisi Carney, Physical Therapist.

## 2018-01-31 NOTE — CARE COORDINATION
1600 Spoke to patient regarding transfer to [facility] facility. Patient has chosen [address]  Access contacted for transfer. Spoke to Brenda @ [facility] regarding discharge planned for tomorrow. She states that if patient is discharged tomorrow that they will not need to be transferred.    Informed patient and Physician

## 2018-01-31 NOTE — CONSULTS
Date End Date Taking? Authorizing Provider   losartan (COZAAR) 100 MG tablet Take 100 mg by mouth daily   Yes Historical Provider, MD   metFORMIN (GLUCOPHAGE-XR) 500 MG extended release tablet Take 500 mg by mouth 2 times daily   Yes Historical Provider, MD   LISINOPRIL PO Take 2.5 mg by mouth    Yes Historical Provider, MD   clonazePAM (KLONOPIN) 0.25 MG disintegrating tablet Take 0.25 mg by mouth 2 times daily. Yes Historical Provider, MD   Sertraline HCl (ZOLOFT PO) Take 150 mg by mouth daily    Yes Historical Provider, MD   simvastatin (ZOCOR) 40 MG tablet Take 40 mg by mouth nightly   Yes Historical Provider, MD         REVIEW OF SYSTEMS:       CONSTITUTIONAL: negative for  fevers, chills, fatigue and malaise   EYES: negative for double vision, blurred vision and photophobia    HEENT: negative for tinnitus, epistaxis and sore throat   RESPIRATORY: negative for cough, shortness of breath, wheezing   CARDIOVASCULAR: negative for chest pain, palpitations, syncope, edema   GASTROINTESTINAL: negative for nausea, vomiting, diarrhea, constipation, abdominal pain   GENITOURINARY: negative for incontinence   MUSCULOSKELETAL: negative for neck or back pain   NEUROLOGICAL: positive for speech problems and weakness  negative for headaches, dizziness, seizures and visual disturbance   PSYCHIATRIC: negative for agitated     Review of systems otherwise negative.     PHYSICAL EXAM:       /67   Pulse 80   Temp 96.8 °F (36 °C) (Oral)   Resp 18   Ht 5' 2\" (1.575 m)   Wt 182 lb (82.6 kg)   SpO2 97%   BMI 33.29 kg/m²       CONSTITUTIONAL: no apparent distress, appears stated age   HEAD: normocephalic, atraumatic   ENT: moist mucous membranes, uvula midline   NECK: supple, symmetric, no midline tenderness to palpation   BACK: without midline tenderness, step-offs or deformities   LUNGS: clear to auscultation bilaterally   CARDIOVASCULAR: regular rate and rhythm   ABDOMEN: Soft, non-tender, non-distended with normal active bowel sounds   NEUROLOGIC:  EYE OPENING     Spontaneous - 4 [x]       To voice - 3 []       To pain - 2 []       None - 1 []    VERBAL RESPONSE     Appropriate, oriented - 5 [x]       Dazed or confused - 4 []       Syllables, expletives - 3 []       Grunts - 2 []       None - 1 []    MOTOR RESPONSE     Spontaneous, command - 6 [x]       Localizes pain - 5 []       Withdraws pain - 4 []       Abnormal flexion - 3 []       Abnormal extension - 2 []       None - 1 []            Total GCS:  15    Mental Status:  A & O x3    awake               Cranial Nerves:    cranial nerves II-XII are grossly intact, visual fields intact  R facial droop+    Motor Exam:    Drift:  absent  Tone:  normal    Motor exam is symmetrical 5 out of 5 all extremities bilaterally    Sensory:    Touch:    Right Upper Extremity:  normal  Left Upper Extremity:  normal  Right Lower Extremity:  normal  Left Lower Extremity:  normal    Deep Tendon Reflexes:    Right Bicep:  2+  Left Bicep:  2+  Right Knee:  2+  Left Knee:  2+    Plantar Response:  Right:  downgoing  Left:  downgoing    Clonus:  N/A  Alberto's:  N/A    Coordination/Dysmetria:  Right:  normal  Left:  normal      Gait:  Not assessed     SKIN: no rash           LABS AND IMAGING:     CBC:   Lab Results   Component Value Date    WBC 8.6 01/31/2018    RBC 3.92 01/31/2018    HGB 11.2 01/31/2018    HCT 35.9 01/31/2018    MCV 91.6 01/31/2018    MCH 28.6 01/31/2018    MCHC 31.2 01/31/2018    RDW 13.6 01/31/2018     01/31/2018    MPV 10.8 01/31/2018     Platelets:    Lab Results   Component Value Date     01/31/2018     BMP:    Lab Results   Component Value Date     01/31/2018    K 4.4 01/31/2018     01/31/2018    CO2 25 01/31/2018    BUN 15 01/31/2018    CREATININE 1.00 01/31/2018    CALCIUM 9.1 01/31/2018    GFRAA >60 01/31/2018    LABGLOM 55 01/31/2018    GLUCOSE 109 01/31/2018     Warfarin PT/INR:  No components found for: PTPATWAR, PTINRWAR  U/A:  No results found for: NITRITE, COLORU, PHUR, LABCAST, [address], RBCUA, MUCUS, TRICHOMONAS, YEAST, BACTERIA, CLARITYU, SPECGRAV, LEUKOCYTESUR, UROBILINOGEN, BILIRUBINUR, BLOODU, GLUCOSEU, AMORPHOUS       Radiology Review:    Ct Head Without Contrast    Result Date: 1/31/2018  EXAMINATION: CT OF THE HEAD WITHOUT CONTRAST  1/31/2018 12:09 pm TECHNIQUE: CT of the head was performed without the administration of intravenous contrast. Dose modulation, iterative reconstruction, and/or weight based adjustment of the mA/kV was utilized to reduce the radiation dose to as low as reasonably achievable. COMPARISON: 30 January 2018 HISTORY: ORDERING SYSTEM PROVIDED HISTORY: 24-hr post tpa TECHNOLOGIST PROVIDED HISTORY: Has a \"code stroke\" or \"stroke alert\" been called? ->No FINDINGS: BRAIN/VENTRICLES: There is no acute intracranial hemorrhage, or midline shift. Extra-axial calcified mass in the left frontal fossa is noted without significant mass effect consistent with meningioma of 5.4 mm. No abnormal extra-axial fluid collection. The gray-white differentiation is maintained without evidence of an acute infarct. There is no evidence of hydrocephalus. Encephalomalacia is re-demonstrated in the right occipital lobe with subtle mass effect consistent with a subacute infarct. Patchy hypodensity is present in the white matter likely related to chronic microvascular change. ORBITS: The visualized portion of the orbits demonstrate no acute abnormality. SINUSES: The visualized paranasal sinuses and mastoid air cells demonstrate no acute abnormality. SOFT TISSUES/SKULL:  No acute abnormality of the visualized skull or soft tissues. Estimated biologic radiation dose for this procedure:  1127.57 mGy/cm2. No significant change from prior study. The patient has right occipital encephalomalacia with subtle mass effect, appearance suggesting subacute infarct. White matter chronic changes are present. Probable left frontal meningioma.   No intracranial hemorrhage is noted. Ct Head Wo Contrast    Addendum Date: 1/30/2018    ADDENDUM: The extra-axial mass described in #3 of the impression is along the LEFT inferior frontal lobe as described in the findings section. Result Date: 1/30/2018  EXAMINATION: CT OF THE HEAD WITHOUT CONTRAST  1/30/2018 1:42 pm TECHNIQUE: CT of the head was performed without the administration of intravenous contrast. Dose modulation, iterative reconstruction, and/or weight based adjustment of the mA/kV was utilized to reduce the radiation dose to as low as reasonably achievable. COMPARISON: 01/30/2018 at 10:27 a.m. HISTORY: Right arm weakness with slurred speech. Initial evaluation. FINDINGS: BRAIN/VENTRICLES: There is no evidence of an acute intracranial hemorrhage. There appears to be an extra-axial hyperdense mass along the inferior left frontal lobe, which contributes to mass effect on the left inferior frontal lobe. There is rightward bowing of the midline structures at this level. This is similar to the prior exam.  Otherwise, there is no evidence of mass effect or midline shift. Encephalomalacia seen within the right occipital lobe, which appears unchanged. There is no evidence of hydrocephalus. Scattered atherosclerosis of the intracranial vasculature. ORBITS: The visualized portion of the orbits demonstrate no acute abnormality. SINUSES: The visualized paranasal sinuses and mastoid air cells demonstrate no acute abnormality. SOFT TISSUES/SKULL:  No acute abnormality of the visualized skull or soft tissues. 1. No acute intracranial abnormality. 2. Sequelae of prior infarct involving the right occipital lobe. 3. Relatively hyperdense extra-axial mass along the inferior right frontal lobe, which may represent a meningioma. Mass effect on the inferior right frontal lobe with slight rightward bowing of the midline structures at this level.  Findings were discussed with Dr. Lee Ann Galeas on 1/30/2018 at 1:57 pm. Cta Neck W Contrast    Result Date: 1/30/2018  EXAMINATION: CTA OF THE HEAD WITH CONTRAST; CTA OF THE NECK 1/30/2018 2:07 pm: TECHNIQUE: CTA of the head/brain was performed with the administration of intravenous contrast. Multiplanar reformatted images are provided for review. MIP images are provided for review. Dose modulation, iterative reconstruction, and/or weight based adjustment of the mA/kV was utilized to reduce the radiation dose to as low as reasonably achievable.; CTA of the neck was performed with the administration of intravenous contrast. Multiplanar reformatted images are provided for review. MIP images are provided for review. Stenosis of the internal carotid arteries measured using NASCET criteria. Dose modulation, iterative reconstruction, and/or weight based adjustment of the mA/kV was utilized to reduce the radiation dose to as low as reasonably achievable. COMPARISON: None. HISTORY: Acute slurred speech. Initial evaluation. FINDINGS: CTA NECK: AORTIC ARCH/ARCH VESSELS: There is a normal 3 vessel arch configuration. Scattered atherosclerosis of the aortic arch and arch vessels. There is no flow limiting stenosis of the innominate or left subclavian artery. The right subclavian artery is obscured due to in flowing contrast. CAROTID ARTERIES: There is no evidence of a flow limiting stenosis of the common carotid arteries. Atherosclerosis of the carotid bulbs and proximal internal carotid arteries bilaterally without evidence of a flow limiting stenosis by NASCET criteria. VERTEBRAL ARTERIES: The vertebral arteries both arise from the subclavian arteries. No focal stenosis is seen of either vertebral artery. SOFT TISSUES: A lobulated enhancing masses seen along the right tongue base measuring approximately 23 x 19 x 19 mm (AP x TRANS x CC). This does not appear to extend to the left of midline. There is no evidence of cervical lymphadenopathy by size criteria.  BONES: No acute osseous abnormality identified. CTA HEAD: ANTERIOR CIRCULATION: There is non flow limiting atherosclerosis involving the internal carotid arteries bilaterally. No focal stenosis seen of the anterior cerebral or middle cerebral arteries. POSTERIOR CIRCULATION: The posterior cerebral arteries demonstrate no focal stenosis. The vertebral and basilar arteries appear unremarkable. ANEURYSM: No intracranial aneurysm is seen. BRAIN: Enhancing extra-axial masses seen along is the inferior left frontal convexity. There is mass effect on the inferior left frontal lobe. 1. No focal stenosis is seen of the cervical carotid or vertebral arteries. 2. No focal stenosis of the [place]. 3. Incidentally noted is an enhancing lobulated mass within the right tongue base. Consider correlation with direct visualization. Findings were discussed with Dr. Valentín Torres on 1/30/2018 at 2:21 pm.     Cta Head W Contrast    Result Date: 1/30/2018  EXAMINATION: CTA OF THE HEAD WITH CONTRAST; CTA OF THE NECK 1/30/2018 2:07 pm: TECHNIQUE: CTA of the head/brain was performed with the administration of intravenous contrast. Multiplanar reformatted images are provided for review. MIP images are provided for review. Dose modulation, iterative reconstruction, and/or weight based adjustment of the mA/kV was utilized to reduce the radiation dose to as low as reasonably achievable.; CTA of the neck was performed with the administration of intravenous contrast. Multiplanar reformatted images are provided for review. MIP images are provided for review. Stenosis of the internal carotid arteries measured using NASCET criteria. Dose modulation, iterative reconstruction, and/or weight based adjustment of the mA/kV was utilized to reduce the radiation dose to as low as reasonably achievable. COMPARISON: None. HISTORY: Acute slurred speech. Initial evaluation. FINDINGS: CTA NECK: AORTIC ARCH/ARCH VESSELS: There is a normal 3 vessel arch configuration.

## 2018-01-31 NOTE — H&P
[address] [facility]    HISTORY AND PHYSICAL EXAMINATION            Date:   1/31/2018  Patient name:  Delphine Salazar  Date of admission:  1/30/2018  1:09 PM  MRN:   [id number]  Account:  [de-identified]  YOB: 1950  PCP:    Alesha Mcdaniels MD  Room:   9982/8293-79  Code Status:    Full Code    Chief Complaint:     Chief Complaint   Patient presents with    Facial Droop       History Obtained From:     patient, family member - daughter , electronic medical record    History of Present Illness: The patient is a 78 y.o. Non-/non  female who presents with Facial Droop   and she is admitted to the hospital for the management of  TIA -     Patient was admitted to neuro ICU for acute right-sided weakness, slurred speech. Patient reported acute onset right arm weakness that started 7 AM on 1/30/18 while she was getting ready to go for ultrasound of breast for abnormal mammogram.  Weakness lasted for about 5 minutes. While at [address] [facility] she had weakness of her right arm again that lasted a few minutes. Patient also started to have slurred speech. Weakness then marched to R leg as well . Patient was taken to emergency room and received TPA. She denies any previous history of TIA in the past.  Patient has known history of borderline diabetes takes metformin, hypertension. She was not taking aspirin prior to admission. MRI brain, CTA head and neck was negative for infarct, occlusion. Incidental left frontal extra-axial mass was noted suggestive of meningioma. Patient also had right posterior tongue mass found. She denies any dysphagia, weight loss. Patient is nonsmoker.       Past Medical History:     Past Medical History:   Diagnosis Date    Depression     Diabetes mellitus ([address]     Hyperlipidemia     Hypertension         Past Surgical History:     Past Surgical History:   Procedure Laterality Date    HYSTERECTOMY Medications Prior to Admission:     Prior to Admission medications    Medication Sig Start Date End Date Taking? Authorizing Provider   losartan (COZAAR) 100 MG tablet Take 100 mg by mouth daily   Yes Historical Provider, MD   metFORMIN (GLUCOPHAGE-XR) 500 MG extended release tablet Take 500 mg by mouth 2 times daily   Yes Historical Provider, MD   LISINOPRIL PO Take 2.5 mg by mouth    Yes Historical Provider, MD   clonazePAM (KLONOPIN) 0.25 MG disintegrating tablet Take 0.25 mg by mouth 2 times daily. Yes Historical Provider, MD   Sertraline HCl (ZOLOFT PO) Take 150 mg by mouth daily    Yes Historical Provider, MD   simvastatin (ZOCOR) 40 MG tablet Take 40 mg by mouth nightly   Yes Historical Provider, MD        Allergies:     Review of patient's allergies indicates no known allergies. Social History:     Tobacco:    reports that she has never smoked. She has never used smokeless tobacco.  Alcohol:      reports that she drinks about 0.6 oz of alcohol per week . Drug Use:  reports that she does not use drugs. Family History:     No family history on file. Review of Systems:     Positive and Negative as described in HPI. Review of Systems   Constitutional: Negative for activity change, appetite change, chills, fatigue, fever and unexpected weight change. HENT: Negative for congestion, mouth sores, postnasal drip, sinus pressure, sore throat and voice change. Eyes: Negative for visual disturbance. Respiratory: Negative for cough, shortness of breath and wheezing. Cardiovascular: Negative for chest pain and palpitations. Gastrointestinal: Negative for abdominal pain, blood in stool, constipation, diarrhea, nausea and vomiting. Endocrine: Negative for polyuria. Genitourinary: Negative for difficulty urinating, dysuria, frequency and urgency. Musculoskeletal: Negative for arthralgias, joint swelling and myalgias.    Neurological: Negative for dizziness, tremors, speech difficulty, light-headedness and headaches. Physical Exam:   /67   Pulse 80   Temp 96.8 °F (36 °C) (Oral)   Resp 18   Ht 5' 2\" (1.575 m)   Wt 182 lb (82.6 kg)   SpO2 97%   BMI 33.29 kg/m²   Temp (24hrs), Av.3 °F (36.8 °C), Min:96.8 °F (36 °C), Max:98.9 °F (37.2 °C)    No results for input(s): POCGLU in the last 72 hours. No intake or output data in the 24 hours ending 18 1738  Physical Exam   Constitutional: She is oriented to person, place, and time. She appears well-developed and well-nourished. No distress. HENT:   Mouth/Throat: No oropharyngeal exudate. Eyes: Conjunctivae are normal. Pupils are equal, round, and reactive to light. No scleral icterus. Neck: No JVD present. No thyromegaly present. Cardiovascular: Normal rate, regular rhythm and normal heart sounds. Exam reveals no gallop and no friction rub. No murmur heard. Pulmonary/Chest: Effort normal. No respiratory distress. She has no wheezes. She has no rales. Abdominal: Soft. Bowel sounds are normal. She exhibits no distension and no mass. There is no tenderness. There is no rebound and no guarding. Lymphadenopathy:     She has no cervical adenopathy. Neurological: She is alert and oriented to person, place, and time. No cranial nerve deficit. She exhibits normal muscle tone. Skin: She is not diaphoretic. Nursing note and vitals reviewed.       Investigations:      Laboratory Testing:  Recent Results (from the past 24 hour(s))   Lipid panel - fasting    Collection Time: 18  5:50 AM   Result Value Ref Range    Cholesterol 119 <200 mg/dL    HDL 65 >40 mg/dL    LDL Cholesterol 42 0 - 130 mg/dL    Chol/HDL Ratio 1.8 <5    Triglycerides 61 <150 mg/dL    VLDL NOT REPORTED 1 - 30 mg/dL   CBC WITH AUTO DIFFERENTIAL    Collection Time: 18  5:50 AM   Result Value Ref Range    WBC 8.6 3.5 - 11.3 k/uL    RBC 3.92 (L) 3.95 - 5.11 m/uL    Hemoglobin 11.2 (L) 11.9 - 15.1 g/dL    Hematocrit 35.9 (L) 36.3 - 47.1 %    MCV visualization. CT head without contrast 1/30/18  Impression:        1. No acute intracranial abnormality. 2. Sequelae of prior infarct involving the right occipital lobe. 3. Relatively hyperdense extra-axial mass along the inferior right frontal  lobe, which may represent a meningioma.  Mass effect on the inferior right  frontal lobe with slight rightward bowing of the midline structures at this  level. 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  Follow-up is recommended in this regard. CT head WO contrast 1/3/18   Impression:        No significant change from prior study.  The patient has right occipital  encephalomalacia with subtle mass effect, appearance suggesting subacute  infarct.  White matter chronic changes are present.  Probable left frontal  meningioma.  No intracranial hemorrhage is noted. 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 Towards paralysis- EEG per neurology. Anti epileptic medication per neurology. 2. Type 2 diabetes mellitus - controlled, last A1c 6.2. Hold metformin due to contrast exposure. Insulin as needed. 3. Essential hypertension - low-dose lisinopril. 4. Lingual mass - ENT consult . 5.  Left meningioma- evaluated by neurosurgery. No intervention at this time. Outpatient follow-up in 3 months with MRI brain with and without contrast.     Consultations:   IP CONSULT TO OTOLARYNGOLOGY  IP CONSULT TO NEUROSURGERY    Patient is admitted as inpatient status because of co-morbidities listed above, severity of signs and symptoms as outlined, requirement for current medical therapies and most importantly because of direct risk to patient if care not provided in a hospital setting.     Priscila Hester MD  1/31/2018    Copy sent to Dr. Shannon Morley MD    (Please note that portions of this note were completed with a voice recognition program. Efforts were made to edit the dictations but occasionally words are mis-transcribed.)

## 2018-01-31 NOTE — PROGRESS NOTES
Occupational Therapy   Occupational Therapy Initial Assessment  Date: 2018   Patient Name: Leeroy Sandhu  MRN: [id number]     : 1950    Patient Diagnosis(es): The encounter diagnosis was Facial droop due to stroke.     has a past medical history of Depression; Diabetes mellitus ([address] Hyperlipidemia; and Hypertension. has a past surgical history that includes Hysterectomy.          Restrictions  Restrictions/Precautions  Restrictions/Precautions: General Precautions  Required Braces or Orthoses?: No    Subjective   General  Patient assessed for rehabilitation services?: Yes  Family / Caregiver Present: Yes (Granddtr)  Diagnosis: R weakness, L brain mass  Pain Assessment  Patient Currently in Pain: Yes  Pain Assessment: 0-10  Pain Level: 1  Pain Type: Acute pain  Pain Location: Head  Pain Orientation: Anterior, Posterior  Pain Descriptors: Aching, Headache  Pain Frequency: Continuous  Patient's Stated Pain Goal: No pain  Pain Intervention(s): Ambulation/Increased activity, Therapeutic presence, Emotional support, Distraction, Repositioned  Response to Pain Intervention: Patient Satisfied  Oxygen Therapy  SpO2: 97 %  Social/Functional History  Social/Functional History  Lives With: Son  Type of Home: House  Home Layout: One level  Home Access: Stairs to enter with rails  Entrance Stairs - Number of Steps: 3  Entrance Stairs - Rails: Left  Bathroom Shower/Tub: Walk-in shower  Bathroom Toilet: Standard  Bathroom Equipment: Shower chair  Bathroom Accessibility: Accessible  Home Equipment: Rolling walker, Stephany Bergman, 4 wheeled walker  Receives Help From: Family  ADL Assistance: Independent  Homemaking Assistance: Independent  Homemaking Responsibilities: Yes  Ambulation Assistance: Independent  Transfer Assistance: Independent  Active : Yes  Occupation: Retired  Type of occupation: cook  Leisure & Hobbies: nathaniel, going to sister-in-laws home  Additional Comments: son is on disability however able to assist 24 hrs     Objective   Vision: Within Functional Limits  Vision Exceptions: Wears glasses at all times  Hearing: Within functional limits    Orientation  Overall Orientation Status: Within Functional Limits     Balance  Sitting Balance: Independent  Standing Balance: Independent  Standing Balance  Time: 12 min  Activity: Pt stood bedside, sinkside, and func mob around floor  Sit to stand: Independent  Stand to sit: Independent  Functional Mobility  Functional - Mobility Device: No device  Activity: Other; To/from bathroom  Assist Level: Modified independent   Functional Mobility Comments: SLow gait, no LOB or safety concerns noted  Toilet Transfers  Toilet - Technique: Ambulating  Equipment Used: Grab bars  Toilet Transfer: Modified independent  ADL  Feeding: Independent  Grooming: Independent  UE Bathing: Independent  LE Bathing: Independent  UE Dressing: Independent  LE Dressing: Independent  Toileting: Independent  Tone RUE  RUE Tone: Normotonic  Tone LUE  LUE Tone: Normotonic  Coordination  Movements Are Fluid And Coordinated: Yes     Bed mobility  Supine to Sit: Independent  Sit to Supine: Unable to assess  Scooting: Independent  Transfers  Sit to stand: Independent  Stand to sit: Independent     Cognition  Overall Cognitive Status: WFL     LUE AROM (degrees)  LUE AROM : WFL  RUE AROM (degrees)  RUE AROM : WFL  LUE Strength  Gross LUE Strength: WFL  L Hand Grasp: 5/5  L Hand Release: 5/5  RUE Strength  Gross RUE Strength: WFL  R Hand Grasp: 5/5  R Hand Release: 5/5     Assessment   Prognosis: Good  Decision Making: Low Complexity  Patient Education: Safety awareness, OTPOC, discharge rec  No Skilled OT: Independent with functional mobility; Independent with ADL's;At baseline function; Safe to return home  REQUIRES OT FOLLOW UP: Yes  Activity Tolerance  Activity Tolerance: Patient Tolerated treatment well;Patient limited by pain  Safety Devices  Safety Devices in place: Yes  Type of devices: Gait belt;Nurse notified; All fall risk precautions in place; Left in chair (Guest in room)  Restraints  Initially in place: No  OT Equipment Recommendations  Equipment Needed: No        G-Code  OT G-codes  Functional Assessment Tool Used: [facility]  Score: 24/24  Functional Limitation: Self care  Self Care Current Status (): 0 percent impaired, limited or restricted  Self Care Goal Status (): 0 percent impaired, limited or restricted  Self Care Discharge Status (): 0 percent impaired, limited or restricted        AM-[facility] Inpatient Daily Activity Raw Score: 24  AM-[facility] Inpatient ADL T-Scale Score : 57.54  ADL Inpatient CMS 0-100% Score: 0  ADL Inpatient CMS G-Code Modifier : CH    Goals  Short term goals  Time Frame for Short term goals: OT eval and discharge d/t (I)     Therapy Time   Individual Concurrent Group Co-treatment   Time In 1457         Time Out 1517         Minutes 20            Pt supine in bed with guest present upon arrival. Pt transferred to EOB and performed ADL LB dressing task (I). Pt transferred to standing x2 (I) and func mob to bathroom with no LOB noted. Pt transferred to sitting on toilet with mod I and grab bars for ADL toileting task and returned to standing after bottom care with mod I. Pt stood sinkside during ADL grooming task (I). Pt demo'd func mob around floor with mod I and no safety concerns. Pt sat in recliner (I) and retired sitting with call light within reach and guest in room. Discharge recommendations discussed with patient during initial evaluation. Reorder OT is functionality declines or pt undergoes sx for brain mass. Pt reports possibly changing hospitals soon d/t insurance reasons.     Hannah Leblanc, OTR/L

## 2018-01-31 NOTE — PROGRESS NOTES
Objective:  Oral/Motor  Oral Motor: Exceptions to [facility]  Labial Symmetry: Abnormal symmetry right  Lingual Symmetry: Abnormal symmetry right    Auditory Comprehension  Comprehension: Within Functional Limits    Expression  Primary Mode of Expression: Verbal     Verbal Expression  Verbal Expression: Within functional limits    Motor Speech  Motor Speech: Exceptions to [facility]  Dysarthria : Mild (Slurred speech)    Cognition:     Orientation  Overall Orientation Status: Within Normal Limits      Prognosis:  Speech Therapy Prognosis  Prognosis: Good  Individuals consulted  Consulted and agree with results and recommendations: Patient    Education:  Patient Education: Yes    G-Code:  SLP G-Codes  Functional Limitations: Other Speech Language Pathology  Other Speech-Language Pathology Functional Limitation (): At least 1 percent but less than 20 percent impaired, limited or restricted  Other Speech-Language Pathology Functional Limitation Goal Status (): 0 percent impaired, limited or restricted         Therapy Time:   Individual Concurrent Group Co-treatment   Time In 0850         Time Out 0900         Minutes 10                 Completed by: Concetta Lopez,  Clinician    Cosigned By: Jermaine Guadarrama S.CCC/SLP       1/31/2018 12:13 PM

## 2018-01-31 NOTE — PROGRESS NOTES
Duration 64 ms    Q-T Interval 364 ms    QTc Calculation (Bazett) 438 ms    P Axis 53 degrees    R Axis 14 degrees    T Axis 50 degrees   MRSA DNA Probe, Nasal    Collection Time: 01/30/18  2:53 PM   Result Value Ref Range    Specimen Description . NASAL SWAB     MRSA, DNA, Nasal  NMRSAA     NEGATIVE:  MRSA DNA not detected by nucleic acid amplification.    CBC WITH AUTO DIFFERENTIAL    Collection Time: 01/30/18  2:58 PM   Result Value Ref Range    WBC 11.2 3.5 - 11.3 k/uL    RBC 4.11 3.95 - 5.11 m/uL    Hemoglobin 12.1 11.9 - 15.1 g/dL    Hematocrit 38.2 36.3 - 47.1 %    MCV 92.9 82.6 - 102.9 fL    MCH 29.4 25.2 - 33.5 pg    MCHC 31.7 28.4 - 34.8 g/dL    RDW 13.2 11.8 - 14.4 %    Platelets 620 114 - 465 k/uL    MPV 10.9 8.1 - 13.5 fL    NRBC Automated 0.0 0.0 per 100 WBC    Differential Type NOT REPORTED     Seg Neutrophils 82 (H) 36 - 65 %    Lymphocytes 13 (L) 24 - 43 %    Monocytes 5 3 - 12 %    Eosinophils % 0 (L) 1 - 4 %    Basophils 0 0 - 2 %    Immature Granulocytes 0 0 %    Segs Absolute 9.14 (H) 1.50 - 8.10 k/uL    Absolute Lymph # 1.42 1.10 - 3.70 k/uL    Absolute Mono # 0.56 0.10 - 1.20 k/uL    Absolute Eos # <0.03 0.00 - 0.44 k/uL    Basophils # 0.04 0.00 - 0.20 k/uL    Absolute Immature Granulocyte 0.03 0.00 - 0.30 k/uL    WBC Morphology NOT REPORTED     RBC Morphology NOT REPORTED     Platelet Estimate NOT REPORTED    BASIC METABOLIC PANEL    Collection Time: 01/30/18  2:58 PM   Result Value Ref Range    Glucose 116 (H) 70 - 99 mg/dL    BUN 17 8 - 23 mg/dL    CREATININE 0.88 0.50 - 0.90 mg/dL    Bun/Cre Ratio NOT REPORTED 9 - 20    Calcium 9.0 8.6 - 10.4 mg/dL    Sodium 138 135 - 144 mmol/L    Potassium 4.7 3.7 - 5.3 mmol/L    Chloride 101 98 - 107 mmol/L    CO2 23 20 - 31 mmol/L    Anion Gap 14 9 - 17 mmol/L    GFR Non-African American >60 >60 mL/min    GFR African American >60 >60 mL/min    GFR Comment          GFR Staging NOT REPORTED    MAGNESIUM    Collection Time: 01/30/18  2:58 PM   Result Value 135 - 144 mmol/L    Potassium 4.4 3.7 - 5.3 mmol/L    Chloride 101 98 - 107 mmol/L    CO2 25 20 - 31 mmol/L    Anion Gap 11 9 - 17 mmol/L    GFR Non-African American 55 (L) >60 mL/min    GFR African American >60 >60 mL/min    GFR Comment          GFR Staging NOT REPORTED    MAGNESIUM    Collection Time: 01/31/18  5:50 AM   Result Value Ref Range    Magnesium 2.1 1.6 - 2.6 mg/dL   Phosphorus    Collection Time: 01/31/18  5:50 AM   Result Value Ref Range    Phosphorus 4.0 2.6 - 4.5 mg/dL   Calcium, Ionized    Collection Time: 01/31/18  5:50 AM   Result Value Ref Range    Calcium, Ion 1.18 1.13 - 1.33 mmol/L       RADIOLOGY:  Ct Head Without Contrast    Result Date: 1/31/2018  EXAMINATION: CT OF THE HEAD WITHOUT CONTRAST  1/31/2018 12:09 pm TECHNIQUE: CT of the head was performed without the administration of intravenous contrast. Dose modulation, iterative reconstruction, and/or weight based adjustment of the mA/kV was utilized to reduce the radiation dose to as low as reasonably achievable. COMPARISON: 30 January 2018 HISTORY: ORDERING SYSTEM PROVIDED HISTORY: 24-hr post tpa TECHNOLOGIST PROVIDED HISTORY: Has a \"code stroke\" or \"stroke alert\" been called? ->No FINDINGS: BRAIN/VENTRICLES: There is no acute intracranial hemorrhage, or midline shift. Extra-axial calcified mass in the left frontal fossa is noted without significant mass effect consistent with meningioma of 5.4 mm. No abnormal extra-axial fluid collection. The gray-white differentiation is maintained without evidence of an acute infarct. There is no evidence of hydrocephalus. Encephalomalacia is re-demonstrated in the right occipital lobe with subtle mass effect consistent with a subacute infarct. Patchy hypodensity is present in the white matter likely related to chronic microvascular change. ORBITS: The visualized portion of the orbits demonstrate no acute abnormality.  SINUSES: The visualized paranasal sinuses and mastoid air cells demonstrate no acute abnormality. SOFT TISSUES/SKULL:  No acute abnormality of the visualized skull or soft tissues. Estimated biologic radiation dose for this procedure:  1127.57 mGy/cm2. No significant change from prior study. The patient has right occipital encephalomalacia with subtle mass effect, appearance suggesting subacute infarct. White matter chronic changes are present. Probable left frontal meningioma. No intracranial hemorrhage is noted. Ct Head Wo Contrast    Addendum Date: 1/30/2018    ADDENDUM: The extra-axial mass described in #3 of the impression is along the LEFT inferior frontal lobe as described in the findings section. Result Date: 1/30/2018  EXAMINATION: CT OF THE HEAD WITHOUT CONTRAST  1/30/2018 1:42 pm TECHNIQUE: CT of the head was performed without the administration of intravenous contrast. Dose modulation, iterative reconstruction, and/or weight based adjustment of the mA/kV was utilized to reduce the radiation dose to as low as reasonably achievable. COMPARISON: 01/30/2018 at 10:27 a.m. HISTORY: Right arm weakness with slurred speech. Initial evaluation. FINDINGS: BRAIN/VENTRICLES: There is no evidence of an acute intracranial hemorrhage. There appears to be an extra-axial hyperdense mass along the inferior left frontal lobe, which contributes to mass effect on the left inferior frontal lobe. There is rightward bowing of the midline structures at this level. This is similar to the prior exam.  Otherwise, there is no evidence of mass effect or midline shift. Encephalomalacia seen within the right occipital lobe, which appears unchanged. There is no evidence of hydrocephalus. Scattered atherosclerosis of the intracranial vasculature. ORBITS: The visualized portion of the orbits demonstrate no acute abnormality. SINUSES: The visualized paranasal sinuses and mastoid air cells demonstrate no acute abnormality.  SOFT TISSUES/SKULL:  No acute abnormality of the visualized skull or soft tissues. 1. No acute intracranial abnormality. 2. Sequelae of prior infarct involving the right occipital lobe. 3. Relatively hyperdense extra-axial mass along the inferior right frontal lobe, which may represent a meningioma. Mass effect on the inferior right frontal lobe with slight rightward bowing of the midline structures at this level. Findings were discussed with Dr. Jennie Pickett on 1/30/2018 at 1:57 pm.     Cta Neck W Contrast    Result Date: 1/30/2018  EXAMINATION: CTA OF THE HEAD WITH CONTRAST; CTA OF THE NECK 1/30/2018 2:07 pm: TECHNIQUE: CTA of the head/brain was performed with the administration of intravenous contrast. Multiplanar reformatted images are provided for review. MIP images are provided for review. Dose modulation, iterative reconstruction, and/or weight based adjustment of the mA/kV was utilized to reduce the radiation dose to as low as reasonably achievable.; CTA of the neck was performed with the administration of intravenous contrast. Multiplanar reformatted images are provided for review. MIP images are provided for review. Stenosis of the internal carotid arteries measured using NASCET criteria. Dose modulation, iterative reconstruction, and/or weight based adjustment of the mA/kV was utilized to reduce the radiation dose to as low as reasonably achievable. COMPARISON: None. HISTORY: Acute slurred speech. Initial evaluation. FINDINGS: CTA NECK: AORTIC ARCH/ARCH VESSELS: There is a normal 3 vessel arch configuration. Scattered atherosclerosis of the aortic arch and arch vessels. There is no flow limiting stenosis of the innominate or left subclavian artery. The right subclavian artery is obscured due to in flowing contrast. CAROTID ARTERIES: There is no evidence of a flow limiting stenosis of the common carotid arteries. Atherosclerosis of the carotid bulbs and proximal internal carotid arteries bilaterally without evidence of a flow limiting stenosis by NASCET criteria.  VERTEBRAL review. Stenosis of the internal carotid arteries measured using NASCET criteria. Dose modulation, iterative reconstruction, and/or weight based adjustment of the mA/kV was utilized to reduce the radiation dose to as low as reasonably achievable. COMPARISON: None. HISTORY: Acute slurred speech. Initial evaluation. FINDINGS: CTA NECK: AORTIC ARCH/ARCH VESSELS: There is a normal 3 vessel arch configuration. Scattered atherosclerosis of the aortic arch and arch vessels. There is no flow limiting stenosis of the innominate or left subclavian artery. The right subclavian artery is obscured due to in flowing contrast. CAROTID ARTERIES: There is no evidence of a flow limiting stenosis of the common carotid arteries. Atherosclerosis of the carotid bulbs and proximal internal carotid arteries bilaterally without evidence of a flow limiting stenosis by NASCET criteria. VERTEBRAL ARTERIES: The vertebral arteries both arise from the subclavian arteries. No focal stenosis is seen of either vertebral artery. SOFT TISSUES: A lobulated enhancing masses seen along the right tongue base measuring approximately 23 x 19 x 19 mm (AP x TRANS x CC). This does not appear to extend to the left of midline. There is no evidence of cervical lymphadenopathy by size criteria. BONES: No acute osseous abnormality identified. CTA HEAD: ANTERIOR CIRCULATION: There is non flow limiting atherosclerosis involving the internal carotid arteries bilaterally. No focal stenosis seen of the anterior cerebral or middle cerebral arteries. POSTERIOR CIRCULATION: The posterior cerebral arteries demonstrate no focal stenosis. The vertebral and basilar arteries appear unremarkable. ANEURYSM: No intracranial aneurysm is seen. BRAIN: Enhancing extra-axial masses seen along is the inferior left frontal convexity. There is mass effect on the inferior left frontal lobe. 1. No focal stenosis is seen of the cervical carotid or vertebral arteries.  2. No focal stenosis of the [place]. 3. Incidentally noted is an enhancing lobulated mass within the right tongue base. Consider correlation with direct visualization. Findings were discussed with Dr. Yamilet Arevalo on 1/30/2018 at 2:21 pm.     Mri Brain W Wo Contrast    Result Date: 1/30/2018  EXAMINATION: MRI OF THE BRAIN WITHOUT AND WITH CONTRAST  1/30/2018 6:28 pm TECHNIQUE: Multiplanar multisequence MRI of the head/brain was performed without and with the administration of intravenous contrast. COMPARISON: None. HISTORY: ORDERING SYSTEM PROVIDED HISTORY: eval mass vs infarct TECHNOLOGIST PROVIDED HISTORY: Stealth protocol FINDINGS: INTRACRANIAL STRUCTURES/VENTRICLES:  Ventricles are slightly prominent. Ventricles are midline. Sulci are also slightly prominent. Scattered small foci of increased T2 and FLAIR signal are noted in the periventricular and subcortical white matter bilaterally. There is an old infarct in the right occipital pole with encephalomalacia and gliosis. There is also curvilinear high T1 signal in this region suggesting petechial hemorrhagic staining or petechial mineralization. Multiple small old infarcts are noted in the cerebellum bilaterally. There is an area of increased T2 and FLAIR signal in the inferior left frontal region marginating the posterior superior aspect of the left orbit medially. There is homogeneous enhancement of this lesion measuring approximately 2.4 x 2.1 cm. On sagittal reformatted imaging, this mass lesion measures approximately 2.3 cm in the AP dimension. On the coronal reformatted images, this lesion measures approximately 1.4 cm craniocaudal.  No restricted diffusion signal is noted. No other mass lesions are noted in the parenchyma or extra-axial spaces. 2.5 cm focal area of asymmetric increased T1 signal is noted in the right posterior tongue base on the postcontrast axial images. This likely represents a focal enhancing mass.  ORBITS: The visualized portion of the orbits demonstrate no acute abnormality. SINUSES: The visualized paranasal sinuses and mastoid air cells are well aerated. BONES/SOFT TISSUES: The bone marrow signal intensity appears normal. The soft tissues demonstrate no acute abnormality. Left inferior frontal extra-axial enhancing mass suggesting meningioma Multiple old infarcts Patchy small vessel ischemic changes Right posterior tongue base lesion. Follow-up is recommended in this regard. Labs and imaging reviewed with Dr. Anders Dubin:  Gen: Alert and oriented and resting comfortably  CV: RRR  Resp: CTAB  Abd: soft, non-distended  Skin: Cap refill <2 seconds    NEURO EXAM:  Alert and Oriented x 3  Follows Commands  Spontaneous Movement to all extremities  Strength is 5/5 in all extremities. Sensation is intact throughout. EOMI. Pupils are 3 mm reactive bilaterally. NUTRITION:   General diet    DRAINS:   There are no drains to monitor at this time. DVT PROPHYLAXIS:  SCD sleeves  Thigh High KATHERIN Stockings  No anticoagulation at this time. PLAN:    Neuro:  -Repeat CT head is normal.  -MRI concerning for meningioma. Neurosurgery recommends outpatient follow-up with repeat MRI.  - Okay to restart her aspirin and Plavix    Cards:  -Okay to normalize BP  - Hemodynamically stable. Continue to monitor.   - Continue lisinopril 2.5 mg daily  - Continue the Lipitor 80 mg    Pulm:  - Continue to monitor respiratory status. - Follow up ENT recommendations for lingual mass    FENGI:  - Carb control diet  - Monitor electrolytes. - Senokot and milk of mag when necessary    Heme/ID:   - Afebrile. Continue to monitor.  - Daily CBC. Hemoglobin stable.     Dipo: Okay to transfer out of ICU                  Eugene Walker MD, [address] [place] [address]  Pager [phone]  1/31/2018  7:14 AM

## 2018-02-01 VITALS
TEMPERATURE: 98.6 F | HEIGHT: 62 IN | OXYGEN SATURATION: 96 % | SYSTOLIC BLOOD PRESSURE: 91 MMHG | DIASTOLIC BLOOD PRESSURE: 60 MMHG | RESPIRATION RATE: 13 BRPM | WEIGHT: 182 LBS | BODY MASS INDEX: 33.49 KG/M2 | HEART RATE: 55 BPM

## 2018-02-01 LAB
ABSOLUTE EOS #: 0.14 K/UL (ref 0–0.44)
ABSOLUTE IMMATURE GRANULOCYTE: <0.03 K/UL (ref 0–0.3)
ABSOLUTE LYMPH #: 2.97 K/UL (ref 1.1–3.7)
ABSOLUTE MONO #: 0.83 K/UL (ref 0.1–1.2)
ANION GAP SERPL CALCULATED.3IONS-SCNC: 12 MMOL/L (ref 9–17)
BASOPHILS # BLD: 1 % (ref 0–2)
BASOPHILS ABSOLUTE: 0.07 K/UL (ref 0–0.2)
BUN BLDV-MCNC: 14 MG/DL (ref 8–23)
BUN/CREAT BLD: ABNORMAL (ref 9–20)
CALCIUM IONIZED: 1.16 MMOL/L (ref 1.13–1.33)
CALCIUM SERPL-MCNC: 8.7 MG/DL (ref 8.6–10.4)
CHLORIDE BLD-SCNC: 106 MMOL/L (ref 98–107)
CO2: 24 MMOL/L (ref 20–31)
CREAT SERPL-MCNC: 0.97 MG/DL (ref 0.5–0.9)
DIFFERENTIAL TYPE: ABNORMAL
EOSINOPHILS RELATIVE PERCENT: 2 % (ref 1–4)
GFR AFRICAN AMERICAN: >60 ML/MIN
GFR NON-AFRICAN AMERICAN: 57 ML/MIN
GFR SERPL CREATININE-BSD FRML MDRD: ABNORMAL ML/MIN/{1.73_M2}
GFR SERPL CREATININE-BSD FRML MDRD: ABNORMAL ML/MIN/{1.73_M2}
GLUCOSE BLD-MCNC: 105 MG/DL (ref 70–99)
GLUCOSE BLD-MCNC: 107 MG/DL (ref 65–105)
GLUCOSE BLD-MCNC: 112 MG/DL (ref 65–105)
HCT VFR BLD CALC: 35.8 % (ref 36.3–47.1)
HEMOGLOBIN: 10.9 G/DL (ref 11.9–15.1)
IMMATURE GRANULOCYTES: 0 %
LYMPHOCYTES # BLD: 33 % (ref 24–43)
MAGNESIUM: 2.2 MG/DL (ref 1.6–2.6)
MCH RBC QN AUTO: 28.8 PG (ref 25.2–33.5)
MCHC RBC AUTO-ENTMCNC: 30.4 G/DL (ref 28.4–34.8)
MCV RBC AUTO: 94.7 FL (ref 82.6–102.9)
MONOCYTES # BLD: 9 % (ref 3–12)
NRBC AUTOMATED: 0 PER 100 WBC
PDW BLD-RTO: 13.6 % (ref 11.8–14.4)
PHOSPHORUS: 3.9 MG/DL (ref 2.6–4.5)
PLATELET # BLD: 254 K/UL (ref 138–453)
PLATELET ESTIMATE: ABNORMAL
PMV BLD AUTO: 11.1 FL (ref 8.1–13.5)
POTASSIUM SERPL-SCNC: 3.9 MMOL/L (ref 3.7–5.3)
RBC # BLD: 3.78 M/UL (ref 3.95–5.11)
RBC # BLD: ABNORMAL 10*6/UL
SEG NEUTROPHILS: 55 % (ref 36–65)
SEGMENTED NEUTROPHILS ABSOLUTE COUNT: 4.99 K/UL (ref 1.5–8.1)
SODIUM BLD-SCNC: 142 MMOL/L (ref 135–144)
WBC # BLD: 9 K/UL (ref 3.5–11.3)
WBC # BLD: ABNORMAL 10*3/UL

## 2018-02-01 PROCEDURE — 84100 ASSAY OF PHOSPHORUS: CPT

## 2018-02-01 PROCEDURE — 6370000000 HC RX 637 (ALT 250 FOR IP): Performed by: EMERGENCY MEDICINE

## 2018-02-01 PROCEDURE — 99239 HOSP IP/OBS DSCHRG MGMT >30: CPT | Performed by: FAMILY MEDICINE

## 2018-02-01 PROCEDURE — 6360000002 HC RX W HCPCS: Performed by: PSYCHIATRY & NEUROLOGY

## 2018-02-01 PROCEDURE — 83735 ASSAY OF MAGNESIUM: CPT

## 2018-02-01 PROCEDURE — 92507 TX SP LANG VOICE COMM INDIV: CPT

## 2018-02-01 PROCEDURE — 36415 COLL VENOUS BLD VENIPUNCTURE: CPT

## 2018-02-01 PROCEDURE — 82947 ASSAY GLUCOSE BLOOD QUANT: CPT

## 2018-02-01 PROCEDURE — 80048 BASIC METABOLIC PNL TOTAL CA: CPT

## 2018-02-01 PROCEDURE — 2580000003 HC RX 258: Performed by: NURSE PRACTITIONER

## 2018-02-01 PROCEDURE — 94762 N-INVAS EAR/PLS OXIMTRY CONT: CPT

## 2018-02-01 PROCEDURE — 85025 COMPLETE CBC W/AUTO DIFF WBC: CPT

## 2018-02-01 PROCEDURE — 6370000000 HC RX 637 (ALT 250 FOR IP): Performed by: NEUROLOGICAL SURGERY

## 2018-02-01 PROCEDURE — 82330 ASSAY OF CALCIUM: CPT

## 2018-02-01 RX ORDER — 0.9 % SODIUM CHLORIDE 0.9 %
500 INTRAVENOUS SOLUTION INTRAVENOUS ONCE
Status: COMPLETED | OUTPATIENT
Start: 2018-02-01 | End: 2018-02-01

## 2018-02-01 RX ORDER — METFORMIN HYDROCHLORIDE 500 MG/1
500 TABLET, EXTENDED RELEASE ORAL 2 TIMES DAILY WITH MEALS
Status: DISCONTINUED | OUTPATIENT
Start: 2018-02-01 | End: 2018-02-01 | Stop reason: HOSPADM

## 2018-02-01 RX ORDER — ASPIRIN 81 MG/1
81 TABLET, CHEWABLE ORAL DAILY
Qty: 30 TABLET | Refills: 3 | Status: SHIPPED | OUTPATIENT
Start: 2018-02-02

## 2018-02-01 RX ORDER — LEVETIRACETAM 500 MG/1
500 TABLET ORAL 2 TIMES DAILY
Qty: 60 TABLET | Refills: 3 | Status: SHIPPED | OUTPATIENT
Start: 2018-02-01 | End: 2018-03-05 | Stop reason: SDUPTHER

## 2018-02-01 RX ORDER — ATORVASTATIN CALCIUM 40 MG/1
40 TABLET, FILM COATED ORAL NIGHTLY
Qty: 30 TABLET | Refills: 3 | Status: SHIPPED | OUTPATIENT
Start: 2018-02-01

## 2018-02-01 RX ADMIN — LISINOPRIL 2.5 MG: 2.5 TABLET ORAL at 09:24

## 2018-02-01 RX ADMIN — CLONAZEPAM 0.25 MG: 0.25 TABLET, ORALLY DISINTEGRATING ORAL at 09:24

## 2018-02-01 RX ADMIN — LEVETIRACETAM 500 MG: 500 INJECTION, SOLUTION INTRAVENOUS at 09:30

## 2018-02-01 RX ADMIN — ASPIRIN 81 MG: 81 TABLET, CHEWABLE ORAL at 09:24

## 2018-02-01 RX ADMIN — SODIUM CHLORIDE 500 ML: 9 INJECTION, SOLUTION INTRAVENOUS at 00:11

## 2018-02-01 RX ADMIN — SERTRALINE 150 MG: 50 TABLET, FILM COATED ORAL at 09:24

## 2018-02-01 ASSESSMENT — ENCOUNTER SYMPTOMS
BLOOD IN STOOL: 0
VOICE CHANGE: 0
VOMITING: 0
SHORTNESS OF BREATH: 0
ABDOMINAL PAIN: 0
SINUS PRESSURE: 0
DIARRHEA: 0
WHEEZING: 0
CONSTIPATION: 0
NAUSEA: 0
COUGH: 0
SORE THROAT: 0

## 2018-02-01 NOTE — PROGRESS NOTES
2359: pt BP 93/32 with a MAP of 42. Pt asymptomatic, NAD, no dizziness. Writer notified Theresa Martinez NP. A 500 mL bolus was ordered and administered. 0045: post 500 mL bolus pt BP was 85/35 with a MAP of 50. Pt stated her \"BP has been running low at night the past few days\". Theresa Martinez NP notified. Instructed writer to  pt closely as long as she is symptomatic and appropriate\". Will continue to monitor pt.

## 2018-02-01 NOTE — DISCHARGE SUMMARY
[facility]   [address]    Discharge Summary     Patient ID: Keri Rider  :  1950   MRN: [id number]     ACCOUNT:  [de-identified]   Patient Location : [address] [place] [address]  Patient's PCP: Bashir Suárez MD  Admit Date: 2018   Discharge Date: 2018     Length of Stay: 2  Code Status:  Prior  Admitting Physician: Cady Rosado MD  Discharge Physician: Jovany Luther MD     Active Discharge Diagnoses:     Primary Problem  Hemispheric carotid artery syndrome      [place] Problems    Diagnosis Date Noted    Meningioma ([facility] [address] [D32.9] 2018    Hemispheric carotid artery syndrome [G45.1] 2018    Right arm weakness [R29.898] 2018    Brain mass [G93.9] 2018    Tongue mass [R22.0] 2018    Received intravenous tissue plasminogen activator (tPA) in emergency department [Z92.82] 2018    Facial droop due to stroke [I69.392]        Admission Condition:  fair     Discharged Condition: stable    Hospital Stay:     Hospital Course:  Keri Rider is a 78 y.o. female who was admitted for the management of   Hemispheric carotid artery syndrome , presented to ER with Facial Droop  Patient was admitted to neuro ICU for acute right-sided weakness, slurred speech.  Patient reported acute onset right arm weakness that started 7 AM on 18 while she was getting ready to go for ultrasound of breast for abnormal mammogram. Matheus Og lasted for about 5 minutes.  While at [employer] she had weakness of her right arm again that lasted a few minutes.  Patient also started to have slurred speech. Matheus Og then marched to R leg as well .  Patient was taken to emergency room and received TPA. Maheshdrake Kerry denies any previous history of TIA in the past. Heidy Cuellar has known history of borderline diabetes takes metformin, hypertension.   She was not taking aspirin prior to admission.  MRI brain, CTA head and neck was negative for infarct, may represent a meningioma.  Mass effect on the inferior right  frontal lobe with slight rightward bowing of the midline structures at this  level.      MRI Brain - 1/30/18           Impression:         Left inferior frontal extra-axial enhancing mass suggesting meningioma    Multiple old infarcts    Patchy small vessel ischemic changes    Right posterior tongue base lesion.  Follow-up is recommended in this regard.      CT head WO contrast 1/3/18           Impression:         No significant change from prior study.  The patient has right occipital  encephalomalacia with subtle mass effect, appearance suggesting subacute  infarct.  White matter chronic changes are present.  Probable left frontal  meningioma.  No intracranial hemorrhage is noted. Consultations:    Consults:     Final Specialist Recommendations/Findings:   IP CONSULT TO OTOLARYNGOLOGY  IP CONSULT TO NEUROSURGERY      The patient was seen and examined on day of discharge and this discharge summary is in conjunction with any daily progress note from day of discharge. Discharge plan:     Disposition: Home    Physician Follow Up:     Gracia Spurling, MD  [address] [place] [address]  [phone]    In 3 months  to evaluate meningioma with repeat MRI brain. This can be done w [facility] as well. Chucho Chisholm MD  [address]  [phone]    Schedule an appointment as soon as possible for a visit  hospital follow up seizure / TIA     Toya Garcia MD  [address] [facility]  [phone]    Schedule an appointment as soon as possible for a visit         Requiring Further Evaluation/Follow Up POST HOSPITALIZATION/Incidental Findings: Follow-up with neurology for repeat brain imaging and monitor of AED . Follow-up with ENT for evaluation of tongue  mass.     Diet: diabetic

## 2018-03-05 ENCOUNTER — OFFICE VISIT (OUTPATIENT)
Dept: NEUROLOGY | Age: 68
End: 2018-03-05
Payer: COMMERCIAL

## 2018-03-05 VITALS
HEIGHT: 62 IN | BODY MASS INDEX: 32.42 KG/M2 | WEIGHT: 176.2 LBS | DIASTOLIC BLOOD PRESSURE: 75 MMHG | HEART RATE: 77 BPM | SYSTOLIC BLOOD PRESSURE: 130 MMHG

## 2018-03-05 DIAGNOSIS — I67.9 CEREBRAL VASCULAR DISEASE: ICD-10-CM

## 2018-03-05 DIAGNOSIS — D32.9 MENINGIOMA (HCC): ICD-10-CM

## 2018-03-05 DIAGNOSIS — G93.89 BRAIN MASS: Primary | ICD-10-CM

## 2018-03-05 PROCEDURE — 99204 OFFICE O/P NEW MOD 45 MIN: CPT | Performed by: NURSE PRACTITIONER

## 2018-03-05 RX ORDER — LEVETIRACETAM 500 MG/1
500 TABLET ORAL 2 TIMES DAILY
Qty: 60 TABLET | Refills: 5 | Status: SHIPPED | OUTPATIENT
Start: 2018-03-05 | End: 2018-06-05 | Stop reason: ALTCHOICE

## 2018-03-05 RX ORDER — LORAZEPAM 0.5 MG/1
0.5 TABLET ORAL DAILY PRN
COMMUNITY

## 2018-03-05 RX ORDER — LOSARTAN POTASSIUM 100 MG/1
100 TABLET ORAL DAILY
COMMUNITY

## 2018-03-05 NOTE — PROGRESS NOTES
Glasses of wine per week    Drug use: No    Sexual activity: No     Other Topics Concern    None     Social History Narrative    None       Current Outpatient Prescriptions   Medication Sig Dispense Refill    losartan (COZAAR) 100 MG tablet Take 100 mg by mouth daily      LORazepam (ATIVAN) 0.5 MG tablet Take 0.5 mg by mouth daily as needed for Anxiety .  sertraline (ZOLOFT) 50 MG tablet Take 50 mg by mouth daily Take with 100 mg to equal 150 mg      levETIRAcetam (KEPPRA) 500 MG tablet Take 1 tablet by mouth 2 times daily 60 tablet 5    aspirin 81 MG chewable tablet Take 1 tablet by mouth daily 30 tablet 3    atorvastatin (LIPITOR) 40 MG tablet Take 1 tablet by mouth nightly 30 tablet 3    metFORMIN (GLUCOPHAGE-XR) 500 MG extended release tablet Take 500 mg by mouth 2 times daily      clonazePAM (KLONOPIN) 0.5 MG tablet Take 0.5 mg by mouth 2 times daily .  Sertraline HCl (ZOLOFT PO) Take 100 mg by mouth daily        No current facility-administered medications for this visit.         No Known Allergies         REVIEW OF SYSTEMS    CONSTITUTIONAL Weight: present, Appetite: present, Fatigue: present      HEENT Ears: normal, Eyes: glasses, Visual disturbance: absent   RESPIRATORY Shortness of breath: absent, Cough: present   CARDIOVASCULAR Chest pain: absent, Leg swelling :absent      GI Constipation: absent, Diarrhea: absent, Swallowing change: absent       Urinary frequency: absent, Urinary urgency: absent, Urinary incontinence: absent   MUSCULOSKELETAL Neck pain: absent, Back pain: absent, Stiffness: absent, Muscle pain: absent, Joint pain: absent Restless legs: absent   DERMATOLOGIC Hair loss: absent, Skin changes: absent   NEUROLOGIC Memory loss: absent, Confusion: absent, Seizures: absent Trouble walking or imbalance: present, Dizziness: absent, Weakness: absent, Numbness: absent Tremor: absent, Spasm: absent, Speech difficulty: present, Headache: present, Light sensitivity: absent

## 2018-06-05 ENCOUNTER — OFFICE VISIT (OUTPATIENT)
Dept: NEUROLOGY | Age: 68
End: 2018-06-05
Payer: COMMERCIAL

## 2018-06-05 VITALS
HEIGHT: 62 IN | HEART RATE: 81 BPM | BODY MASS INDEX: 31.8 KG/M2 | SYSTOLIC BLOOD PRESSURE: 127 MMHG | DIASTOLIC BLOOD PRESSURE: 79 MMHG | WEIGHT: 172.8 LBS

## 2018-06-05 DIAGNOSIS — I67.9 CEREBRAL VASCULAR DISEASE: ICD-10-CM

## 2018-06-05 DIAGNOSIS — D32.9 MENINGIOMA (HCC): Primary | ICD-10-CM

## 2018-06-05 DIAGNOSIS — R56.9 SEIZURE-LIKE ACTIVITY (HCC): ICD-10-CM

## 2018-06-05 PROCEDURE — 99214 OFFICE O/P EST MOD 30 MIN: CPT | Performed by: NURSE PRACTITIONER

## 2018-06-05 RX ORDER — DIVALPROEX SODIUM 500 MG/1
500 TABLET, DELAYED RELEASE ORAL 2 TIMES DAILY
Qty: 60 TABLET | Refills: 5 | Status: SHIPPED | OUTPATIENT
Start: 2018-06-05

## 2018-07-30 ENCOUNTER — TELEPHONE (OUTPATIENT)
Dept: NEUROLOGY | Age: 68
End: 2018-07-30